# Patient Record
Sex: FEMALE | Race: WHITE | NOT HISPANIC OR LATINO | URBAN - METROPOLITAN AREA
[De-identification: names, ages, dates, MRNs, and addresses within clinical notes are randomized per-mention and may not be internally consistent; named-entity substitution may affect disease eponyms.]

---

## 2020-08-03 RX ORDER — ARIPIPRAZOLE 10 MG/1
TABLET ORAL
Qty: 90 TABLET | Refills: 0 | OUTPATIENT
Start: 2020-08-03

## 2020-08-03 RX ORDER — LAMOTRIGINE 150 MG/1
TABLET ORAL
Qty: 90 TABLET | Refills: 0 | OUTPATIENT
Start: 2020-08-03

## 2020-08-11 ENCOUNTER — TELEMEDICINE (OUTPATIENT)
Dept: PSYCHIATRY | Facility: CLINIC | Age: 50
End: 2020-08-11
Payer: COMMERCIAL

## 2020-08-11 DIAGNOSIS — F31.76 BIPOLAR I DISORDER, MOST RECENT EPISODE DEPRESSED, IN FULL REMISSION (HCC): Primary | ICD-10-CM

## 2020-08-11 PROCEDURE — 99214 OFFICE O/P EST MOD 30 MIN: CPT | Performed by: NURSE PRACTITIONER

## 2020-08-11 RX ORDER — ARIPIPRAZOLE 10 MG/1
10 TABLET ORAL DAILY
COMMUNITY
End: 2020-08-11 | Stop reason: SDUPTHER

## 2020-08-11 RX ORDER — ALPRAZOLAM 0.25 MG/1
.125-.25 TABLET ORAL DAILY PRN
COMMUNITY
End: 2020-08-11 | Stop reason: SDUPTHER

## 2020-08-11 RX ORDER — LAMOTRIGINE 150 MG/1
150 TABLET ORAL
COMMUNITY
End: 2020-08-11 | Stop reason: SDUPTHER

## 2020-08-11 RX ORDER — ALPRAZOLAM 0.25 MG/1
.125-.25 TABLET ORAL DAILY PRN
Qty: 30 TABLET | Refills: 0 | Status: SHIPPED | OUTPATIENT
Start: 2020-08-11 | End: 2021-03-09 | Stop reason: SDUPTHER

## 2020-08-11 RX ORDER — LAMOTRIGINE 150 MG/1
150 TABLET ORAL
Qty: 90 TABLET | Refills: 0 | Status: SHIPPED | OUTPATIENT
Start: 2020-08-11 | End: 2020-12-01 | Stop reason: SDUPTHER

## 2020-08-11 RX ORDER — ARIPIPRAZOLE 10 MG/1
10 TABLET ORAL DAILY
Qty: 90 TABLET | Refills: 0 | Status: SHIPPED | OUTPATIENT
Start: 2020-08-11 | End: 2020-12-01 | Stop reason: SDUPTHER

## 2020-08-11 NOTE — PATIENT INSTRUCTIONS
Continue aripiprazole 10 mg daily, lamotrigine 150 mg at bedtime, and alprazolam 0 25 mg 1/2 to 1 tablet daily as needed for anxiety; pt has trazodone previously prescribed that she sometimes takes to help sleep  Next appt 12 weeks

## 2020-08-11 NOTE — BH TREATMENT PLAN
TREATMENT PLAN         [facility]    Name and Date of Birth:  Surya Tao 44 y o  1970    Date of Treatment Plan: August 11, 2020    Diagnosis/Diagnoses:    1  Bipolar I disorder, most recent episode depressed, in full remission [facility])        Strengths/Personal Resources for Self-Care: taking medications as prescribed, ability to adapt to life changes, ability to communicate well, motivation for treatment  Area/Areas of need (in own words): sleep problems  1  Long Term Goal: decreasesleep problems  Target Date: 2 months - 10/11/2020  Person/Persons responsible for completion of goal: Cecy    2  Short Term Objective (s) - How will we reach this goal?:   A  Provider new recommended medication/dosage changes and/or continue medication(s): continue current medications as prescribed Lamictal, Abilify, Xanax  B  continue to diet and exercise to lose weight to improve sleep  C  take medications as prescribed, attend scheduled appts  Target Date: 3 months - 11/11/2020  Person/Persons Responsible for Completion of Goal: Cecy    Progress Towards Goals: continuing treatment    Treatment Modality: medication management every 12 weeks    Review due 90 to 120 days from date of this plan: 4 months - 12/11/2020  Expected length of service: ongoing treatment  My Physician/PA/NP and I have developed this plan together and I agree to work on the goals and objectives  I understand the treatment goals that were developed for my treatment

## 2020-08-11 NOTE — PSYCH
Psychiatric Progress Note   08/11/20     Cecy Tao 44 y o  female MRN: [id number]   @ Encounter: [id number]    Visit Diagnosis:   Encounter Diagnosis     ICD-10-CM    1  Bipolar I disorder, most recent episode depressed, in full remission ([facility] [address] )  F31 [id number]         Virtual Visit yes  Due to COVID-19 precautions and [facility] emergency, with patient's consent appointment was by phone Spoke with patient today for medication management  Pt reports taking medication as prescribed, denies side effects  Psychiatric status since the last visit: unchanged  Current Outpatient Medications:     ALPRAZolam (XANAX) 0 25 mg tablet, Take 0 125-0 25 mg by mouth daily as needed for anxiety, Disp: , Rfl:     ARIPiprazole (ABILIFY) 10 mg tablet, Take 10 mg by mouth daily, Disp: , Rfl:     lamoTRIgine (LaMICtal) 150 MG tablet, Take 150 mg by mouth daily at bedtime, Disp: , Rfl:    Sleep: decreased  Appetite: improved food intake to lose weight  Medication side effects: No   ROS: N/A  Vitals Taken no  There were no vitals filed for this visit  Progress Toward Goals: Stable other than decreased sleep    Assessment: is very happy at work, is dieting to lose weight and improve overall health and especially improve sleep; falls asleep on her arm and it becomes numb  Sometimes takes trazodone previously prescribed  Reports weighing 269 lbs when she began, now 258 lbs           Suicide/Homicide Risk Assessment: See mental Status Exam Below    Mental Status Evaluation:  Appearance:  televisit, not assessed   Behavior:  pleasant, cooperative, calm   Speech:  normal rate and volume   Mood:  euthymic   Affect:  televisit, not assessed   Thought Process:  organized, coherent, goal directed   Associations: intact associations   Thought Content:  normal   Perceptual Disturbances: none   Risk Potential: Suicidal ideation - None  Homicidal ideation - None  Potential for aggression - No   Sensorium:  oriented to person, place and time/date   Memory:  recent and remote memory grossly intact   Consciousness:  alert and awake   Attention/Concentration: attention span and concentration are age appropriate   Insight:  good   Judgment: good       Recommended Treatment:     Planned medication and treatment changes: All current active medications have been reviewed  Continue current psychiatric medications/doses: aripiprazole 10 mg daily, lamotrigine 150 mg daily at bedtime, and alprazolam 0 25 mg as needed for anxiety  Risks / Benefits of Treatment:    Risks, benefits, and possible side effects of medications explained to patient and patient verbalizes understanding and agreement for treatment  Counseling / Coordination of Care:    Medications, treatment progress and treatment plan reviewed with patient  Spent 25 minutes with pt via phone call      ABDIRAHMAN Ricketts 08/11/20

## 2020-12-01 ENCOUNTER — TELEMEDICINE (OUTPATIENT)
Dept: PSYCHIATRY | Facility: CLINIC | Age: 50
End: 2020-12-01
Payer: COMMERCIAL

## 2020-12-01 DIAGNOSIS — F31.76 BIPOLAR I DISORDER, MOST RECENT EPISODE DEPRESSED, IN FULL REMISSION (HCC): Primary | ICD-10-CM

## 2020-12-01 PROCEDURE — 99214 OFFICE O/P EST MOD 30 MIN: CPT | Performed by: NURSE PRACTITIONER

## 2020-12-01 RX ORDER — ARIPIPRAZOLE 10 MG/1
10 TABLET ORAL DAILY
Qty: 90 TABLET | Refills: 0 | Status: SHIPPED | OUTPATIENT
Start: 2020-12-01 | End: 2021-02-25

## 2020-12-01 RX ORDER — LEVOTHYROXINE SODIUM 0.07 MG/1
75 TABLET ORAL DAILY
COMMUNITY
Start: 2020-11-04 | End: 2022-03-28 | Stop reason: SDDI

## 2020-12-01 RX ORDER — LAMOTRIGINE 150 MG/1
150 TABLET ORAL
Qty: 90 TABLET | Refills: 0 | Status: SHIPPED | OUTPATIENT
Start: 2020-12-01 | End: 2021-02-25

## 2021-02-24 DIAGNOSIS — F31.76 BIPOLAR I DISORDER, MOST RECENT EPISODE DEPRESSED, IN FULL REMISSION (HCC): ICD-10-CM

## 2021-02-25 RX ORDER — LAMOTRIGINE 150 MG/1
TABLET ORAL
Qty: 90 TABLET | Refills: 0 | Status: SHIPPED | OUTPATIENT
Start: 2021-02-25 | End: 2021-06-01 | Stop reason: SDUPTHER

## 2021-02-25 RX ORDER — ARIPIPRAZOLE 10 MG/1
TABLET ORAL
Qty: 90 TABLET | Refills: 0 | Status: SHIPPED | OUTPATIENT
Start: 2021-02-25 | End: 2021-06-01 | Stop reason: SDUPTHER

## 2021-03-09 ENCOUNTER — TELEMEDICINE (OUTPATIENT)
Dept: PSYCHIATRY | Facility: CLINIC | Age: 51
End: 2021-03-09
Payer: COMMERCIAL

## 2021-03-09 DIAGNOSIS — F31.76 BIPOLAR I DISORDER, MOST RECENT EPISODE DEPRESSED, IN FULL REMISSION (HCC): Primary | ICD-10-CM

## 2021-03-09 PROCEDURE — 99214 OFFICE O/P EST MOD 30 MIN: CPT | Performed by: NURSE PRACTITIONER

## 2021-03-09 RX ORDER — ALPRAZOLAM 0.25 MG/1
.125-.25 TABLET ORAL DAILY PRN
Qty: 30 TABLET | Refills: 0 | Status: SHIPPED | OUTPATIENT
Start: 2021-03-09 | End: 2021-06-01 | Stop reason: SDUPTHER

## 2021-03-09 NOTE — PSYCH
PROGRESS NOTE        [facility]      Name and Date of Birth:  Leonel Tao 44 y o  1970    Date of Visit: 03/09/21    Pt was seen today for medication management for 30 minutes via Teams with pts consent  Door to office was closed, provider was alone in office  Time spent on Epic chart review of relevant information, note composition, and after-visit plan: 5  minutes  Total time for this visit: 35 minutes    SUBJECTIVE: mood generally stable, good  Is having trouble with her daughter who is 15 and has cognitive and impulse control d/o; pt is done, tired of dealing with the issues  Job is fine, no issues  Takes alprazolam once in a while  12/1/2020: doing well, her medications are working  Normal mood variability, sometimes a couple of days of depressed mood then back to normal  Has job stress  Awakened every couple of hours by pain in her hands from arthritis, goes right back to sleep  Rarely takes alprazolam, does not need a refill today  Continue aripiprazole 10 mg one tab po qd; lamotrigine 150 mg tab one tab po qHS; alprazolam 0 25 mg 1/2-1 tab po qd PRN anxiety      She denies suicidal ideation, intent or plan at present, has no suicidal ideation, intent or plan at present  She denies any auditory hallucinations and visual hallucinations, denies any other delusional thinking, denies any delusional thinking  She denies any side effects from medications    HPI ROS Appetite Changes and Sleep: normal appetite, normal sleep    Review Of Systems:      Constitutional Negative   ENT Negative   Cardiovascular Negative   Respiratory Negative   Gastrointestinal Negative   Genitourinary Negative   Musculoskeletal Negative   Integumentary Negative   Neurological Negative   Endocrine Negative   Other Symptoms Negative and None       Laboratory Results: No results found for this or any previous visit      Substance Abuse History:    Social History     Substance and Sexual Activity   Drug Use Not on file       Family Psychiatric History:     No family history on file      The following portions of the patient's history were reviewed and updated as appropriate: past family history, past medical history, past social history, past surgical history and problem list     Social History     Socioeconomic History    Marital status: /Civil Union     Spouse name: Not on file    Number of children: Not on file    Years of education: Not on file    Highest education level: Not on file   Occupational History    Not on file   Social Needs    Financial resource strain: Not on file    Food insecurity     Worry: Not on file     Inability: Not on file    Transportation needs     Medical: Not on file     Non-medical: Not on file   Tobacco Use    Smoking status: Not on file   Substance and Sexual Activity    Alcohol use: Not on file    Drug use: Not on file    Sexual activity: Not on file   Lifestyle    Physical activity     Days per week: Not on file     Minutes per session: Not on file    Stress: Not on file   Relationships    Social connections     Talks on phone: Not on file     Gets together: Not on file     Attends Druze service: Not on file     Active member of club or organization: Not on file     Attends meetings of clubs or organizations: Not on file     Relationship status: Not on file    Intimate partner violence     Fear of current or ex partner: Not on file     Emotionally abused: Not on file     Physically abused: Not on file     Forced sexual activity: Not on file   Other Topics Concern    Not on file   Social History Narrative    Not on file     Social History     Social History Narrative    Not on file        Social History    None             OBJECTIVE:     Mental Status Evaluation:    Appearance age appropriate, casually dressed   Behavior pleasant, cooperative   Speech normal volume, normal pitch   Mood frustrated   Affect Mood-congruent   Thought Processes logical   Associations intact associations   Thought Content normal   Perceptual Disturbances: none   Abnormal Thoughts  Risk Potential Suicidal ideation - None  Homicidal ideation - None  Potential for aggression - No   Orientation oriented to person, place, time/date and situation   Memory recent and remote memory grossly intact   Cosciousness alert and awake   Attention Span attention span and concentration are age appropriate   Intellect Not formally assessed   Insight age appropriate    Judgement good    Muscle Strength and  Gait muscle strength and tone were normal   Language no difficulty naming common objects   Fund of Knowledge displays adequate knowledge of current events   Pain none   Pain Scale 0       Assessment/Plan:       Diagnoses and all orders for this visit:    Bipolar I disorder, most recent episode depressed, in full remission ([facility] [address] )          Treatment Recommendations/Precautions:    Continue current medications: aripiprazole 10 mg one tab po qd; lamotrigine 150 mg tab one tab po qHS; alprazolam 0 25 mg 1/2-1 tab po qd PRN anxiety    Risks/Benefits      Risks, Benefits And Possible Side Effects Of Medications:    Risks, benefits, and possible side effects of medications explained to patient and patient verbalizes understanding and agreement for treatment  Controlled Medication Discussion:     MARIA DOLORES BRICE [facility] website data was reviewed with pt  Discussed with patient the risks of sedation, respiratory depression, impairment of ability to drive and potential for abuse and addiction related to treatment with ALPRAZOLAM  The patient understands risk of treatment with ALPRAZOLAM, agrees to not drive if feeling impaired, not to request early refills, to take medication as prescribed, to not drink alcohol when taking it, and to not share it with others      Psychotherapy Provided:     Individual psychotherapy provided: No

## 2021-05-28 DIAGNOSIS — F31.76 BIPOLAR I DISORDER, MOST RECENT EPISODE DEPRESSED, IN FULL REMISSION (HCC): ICD-10-CM

## 2021-06-01 ENCOUNTER — OFFICE VISIT (OUTPATIENT)
Dept: PSYCHIATRY | Facility: CLINIC | Age: 51
End: 2021-06-01
Payer: COMMERCIAL

## 2021-06-01 VITALS — HEIGHT: 64 IN | BODY MASS INDEX: 42.34 KG/M2 | WEIGHT: 248 LBS

## 2021-06-01 DIAGNOSIS — Z63.0 MARITAL CONFLICT: ICD-10-CM

## 2021-06-01 DIAGNOSIS — F31.76 BIPOLAR I DISORDER, MOST RECENT EPISODE DEPRESSED, IN FULL REMISSION (HCC): Primary | ICD-10-CM

## 2021-06-01 PROCEDURE — 99214 OFFICE O/P EST MOD 30 MIN: CPT | Performed by: NURSE PRACTITIONER

## 2021-06-01 PROCEDURE — 90833 PSYTX W PT W E/M 30 MIN: CPT | Performed by: NURSE PRACTITIONER

## 2021-06-01 RX ORDER — LAMOTRIGINE 150 MG/1
150 TABLET ORAL
Qty: 90 TABLET | Refills: 0 | Status: SHIPPED | OUTPATIENT
Start: 2021-06-01 | End: 2021-07-13 | Stop reason: DRUGHIGH

## 2021-06-01 RX ORDER — ALPRAZOLAM 0.25 MG/1
.125-.25 TABLET ORAL 2 TIMES DAILY PRN
Qty: 60 TABLET | Refills: 0 | Status: SHIPPED | OUTPATIENT
Start: 2021-06-01 | End: 2021-08-24 | Stop reason: SDUPTHER

## 2021-06-01 RX ORDER — ARIPIPRAZOLE 10 MG/1
10 TABLET ORAL DAILY
Qty: 90 TABLET | Refills: 0 | Status: SHIPPED | OUTPATIENT
Start: 2021-06-01 | End: 2021-09-21 | Stop reason: SDUPTHER

## 2021-06-01 RX ORDER — LAMOTRIGINE 150 MG/1
TABLET ORAL
Qty: 90 TABLET | Refills: 0 | OUTPATIENT
Start: 2021-06-01

## 2021-06-01 RX ORDER — ARIPIPRAZOLE 10 MG/1
TABLET ORAL
Qty: 90 TABLET | Refills: 0 | OUTPATIENT
Start: 2021-06-01

## 2021-06-01 SDOH — SOCIAL STABILITY - SOCIAL INSECURITY: PROBLEMS IN RELATIONSHIP WITH SPOUSE OR PARTNER: Z63.0

## 2021-06-01 NOTE — PATIENT INSTRUCTIONS
Begin to see Gwendolyn Sanchez [facility] for psychotherapy  Increase alprazolam to 0 25 mg - take one tablet by mouth BID PRN anxiety/insomnia  Continue:    aripiprazole 10 mg one tab po qd   lamotrigine 150 mg tab one tab po qHS

## 2021-06-01 NOTE — BH TREATMENT PLAN
TREATMENT PLAN         [facility] [employer]    Name and Date of Birth:  Cosmo Tao 44 y o  1970    Date of Treatment Plan: June 1, 2021    Diagnosis/Diagnoses:    1  Bipolar I disorder, most recent episode depressed, in full remission ([facility] [address] )    2  Marital conflict      Strengths/Personal Resources for Self-Care: taking medications as prescribed, ability to adapt to life changes, ability to communicate well, motivation for treatment      Area/Areas of need (in own words): depression  1          Long Term Goal: continue current status with medication  Target date - 12/1/2021   Person/Persons responsible for completion of goal: татьяна Sam     2          Short Term Objective (s) - How will we reach this goal?:   A  Provider new recommended medication/dosage changes and/or continue medication(s): continue current medications as prescribed Lamictal, Abilify, Xanax  B  take medications as prescribed, attend scheduled appts  C see [facility] psychotherapist  Target date - 12/1/2021   Person/Persons Responsible for Completion of Goal: татьяна Sam     Progress Towards Goals: continuing treatment     Treatment Modality: medication management every 12 weeks     Review due 120 - 180 days from date of this plan:  12/1/2021  Expected length of service: ongoing treatment  My Physician/PA/NP and I have developed this plan together and I agree to work on the goals and objectives  I understand the treatment goals that were developed for my treatment

## 2021-06-01 NOTE — PSYCH
PROGRESS NOTE        [facility]      Name and Date of Birth:  Vivian Tao 44 y o  1970    Date of Visit: 06/01/21    Patient was seen in the office today for medication management and psychotherapy  COVID-19 precautions were followed at all times: social distancing was maintained (except when when pts blood pressure was taken), hand-washing was performed before and after appointment; pts seat was cleaned with germicidal disposable wipes according to product instructions and room was ventilated before pt entered the room and after pt left it  Time spent on psychotherapy: 18 minutes    Treatment modality: medication management; medication education, supportive, self-care, encouraged adaptive behavior      SUBJECTIVE: job is ok  Her 15 yo daughter is off the wall, was in [place] for 5 days after talking about suicide, other daughter's father was living with pt and her family but moved out and is living with people who use drugs and pt is very worried about him  Pt's  is angry with her, says she can't save everyone, threatens to leave, but he has made this threat for 30 years and pt now just says so leave  Sometimes she loves him and sometimes she can't stand him  Her sister was recently diagnosed with colon cancer, and her brother has multiple myeloma  Pt is losing weight and doesn't know why  She sometimes takes two alprazolam so that she can sleep, she did last night but feels a bit drugged now, Doesn't know what she would like to do for herself  [place]'s [place] and the kids last weekend and it was nice, peaceful  Maybe she should see [facility] again for psychotherapy  3/9/2021: SUBJECTIVE: mood generally stable, good  Is having trouble with her daughter who is 15 and has cognitive and impulse control d/o; pt is done, tired of dealing with the issues  Job is fine, no issues  Takes alprazolam once in a while  She denies suicidal ideation, intent or plan at present, has no suicidal ideation, intent or plan at present  She denies any auditory hallucinations and visual hallucinations, denies any other delusional thinking, denies any delusional thinking  She denies any side effects from medications    HPI ROS Appetite Changes and Sleep: normal appetite, poor sleep    Review Of Systems:      Constitutional Negative   ENT Negative   Cardiovascular Negative   Respiratory Negative   Gastrointestinal Negative   Genitourinary Negative   Musculoskeletal Negative   Integumentary Negative   Neurological Negative   Endocrine Negative   Other Symptoms Negative and None       Laboratory Results: No results found for this or any previous visit  Substance Abuse History:    Social History     Substance and Sexual Activity   Drug Use Not on file       Family Psychiatric History:     No family history on file      The following portions of the patient's history were reviewed and updated as appropriate: past family history, past medical history, past social history, past surgical history and problem list     Social History     Socioeconomic History    Marital status: /Civil Union     Spouse name: Not on file    Number of children: Not on file    Years of education: Not on file    Highest education level: Not on file   Occupational History    Not on file   Social Needs    Financial resource strain: Not on file    Food insecurity     Worry: Not on file     Inability: Not on file    Transportation needs     Medical: Not on file     Non-medical: Not on file   Tobacco Use    Smoking status: Not on file   Substance and Sexual Activity    Alcohol use: Not on file    Drug use: Not on file    Sexual activity: Not on file   Lifestyle    Physical activity     Days per week: Not on file     Minutes per session: Not on file    Stress: Not on file   Relationships    Social connections     Talks on phone: Not on file Gets together: Not on file     Attends Pentecostal service: Not on file     Active member of club or organization: Not on file     Attends meetings of clubs or organizations: Not on file     Relationship status: Not on file    Intimate partner violence     Fear of current or ex partner: Not on file     Emotionally abused: Not on file     Physically abused: Not on file     Forced sexual activity: Not on file   Other Topics Concern    Not on file   Social History Narrative    Not on file     Social History     Social History Narrative    Not on file        Social History    None             OBJECTIVE:     Mental Status Evaluation:    Appearance age appropriate, casually dressed   Behavior pleasant, cooperative   Speech normal volume, normal pitch   Mood frustrated   Affect Mood-congruent   Thought Processes Coherent, organized, goal-directed   Associations intact associations   Thought Content normal   Perceptual Disturbances: none   Abnormal Thoughts  Risk Potential Suicidal ideation - None  Homicidal ideation - None  Potential for aggression - No   Orientation oriented to person, place, time/date and situation   Memory recent and remote memory grossly intact   Cosciousness alert and awake   Attention Span attention span and concentration are age appropriate   Intellect Not formally assessed   Insight age appropriate    Judgement good    Muscle Strength and  Gait muscle strength and tone were normal   Language no difficulty naming common objects   Fund of Knowledge displays adequate knowledge of current events   Pain none   Pain Scale 0     Patient's chart was reviewed for relevant lab reports and recent encounters with other providers  Medications, treatment progress and treatment plan were reviewed with patient  Treatment plan was updated with patient who agreed with updated plan      Assessment/Plan:       Diagnoses and all orders for this visit:    Bipolar I disorder, most recent episode depressed, in full remission ([facility] [address] )          Treatment Recommendations/Precautions:    Increase: alprazolam 0 25 mg 1/2-1 tab po qd PRN anxiety to 1/2 - 2 tabs po BID PRN anxiety/sleep  Continue current medications:    aripiprazole 10 mg one tab po qd   lamotrigine 150 mg tab one tab po qHS       Risks/Benefits       Risks, Benefits And Possible Side Effects Of Medications:     Risks, benefits, and possible side effects of medications explained to patient and patient verbalizes understanding and agreement for treatment      Controlled Medication Discussion:      MARIA DOLORES BRICE [facility] website data was reviewed with pt  Discussed with patient the risks of sedation, respiratory depression, impairment of ability to drive and potential for abuse and addiction related to treatment with ALPRAZOLAM  The patient understands risk of treatment with ALPRAZOLAM, agrees to not drive if feeling impaired, not to request early refills, to take medication as prescribed, to not drink alcohol when taking it, and to not share it with others

## 2021-07-13 ENCOUNTER — OFFICE VISIT (OUTPATIENT)
Dept: PSYCHIATRY | Facility: CLINIC | Age: 51
End: 2021-07-13
Payer: COMMERCIAL

## 2021-07-13 VITALS — HEIGHT: 64 IN | WEIGHT: 254.6 LBS | BODY MASS INDEX: 43.46 KG/M2

## 2021-07-13 DIAGNOSIS — Z63.0 MARITAL CONFLICT: ICD-10-CM

## 2021-07-13 DIAGNOSIS — F31.76 BIPOLAR I DISORDER, MOST RECENT EPISODE DEPRESSED, IN FULL REMISSION (HCC): Primary | ICD-10-CM

## 2021-07-13 PROCEDURE — 99214 OFFICE O/P EST MOD 30 MIN: CPT | Performed by: NURSE PRACTITIONER

## 2021-07-13 PROCEDURE — 90833 PSYTX W PT W E/M 30 MIN: CPT | Performed by: NURSE PRACTITIONER

## 2021-07-13 RX ORDER — LAMOTRIGINE 100 MG/1
TABLET ORAL
Qty: 11 TABLET | Refills: 0 | Status: SHIPPED | OUTPATIENT
Start: 2021-07-13 | End: 2021-08-24 | Stop reason: DRUGHIGH

## 2021-07-13 SDOH — SOCIAL STABILITY - SOCIAL INSECURITY: PROBLEMS IN RELATIONSHIP WITH SPOUSE OR PARTNER: Z63.0

## 2021-07-13 NOTE — PSYCH
This note was not shared with the patient due to privacy exception: note includes other individuals    [facility]      Name and Date of Birth:  Wade Tao 46 y o  1970    Date of Visit: 07/13/21    This patient was seen in the office today for medication management and psychotherapy  COVID-19 precautions were followed at all times: masks were worn by this patient and by this writer at all times, social distancing was maintained  Time spent on psychotherapy: 18 minutes    Treatment modality: medication management; medication education, pros and cons of changing medications, encourage adaptive behavior, recommended staying out of the sun and using sunscreen and frequently reapplying it, increasing understanding of causes of relationship issues      SUBJECTIVE: Cecy says that she wants to change from lamotrigine 150 mg qd and aripiprazole 10 mg qd to something else  In two weeks she is going to [facility]  [place] for vacation, and loves to go out in the sun, but then always develops Itchy pimples on her arms and lower legs, just like sunburn but in blotches  She feels her medications are to blame, and does not want to have to stay out of the sun as much as possible, she loves the sun  She agrees to remain on aripiprazole 10 mg for now, and to decrease and stop lamotrigine  She understands that mood instability and depression may return without lamotrigine  Family stresses continue, relationship with her  sometimes better than at other times  Sleep and anxiety somewhat improved with increased dose of alprazolam at her last appointment  Hasn't scheduled an appointment to restart seeing [facility]     6/1/2021: job is ok   Her 15 yo daughter is off the wall, was in [place] for 5 days after talking about suicide, other daughter's father was living with pt and her family but moved out and is living with people who use drugs and pt is very worried about him  Pt's  is angry with her, says she can't save everyone, threatens to leave, but he has made this threat for 30 years and pt now just says so leave  Sometimes she loves him and sometimes she can't stand him  Her sister was recently diagnosed with colon cancer, and her brother has multiple myeloma  Pt is losing weight and doesn't know why  She sometimes takes two alprazolam so that she can sleep, she did last night but feels a bit drugged now, Doesn't know what she would like to do for herself  [place] med[place]'s [place] the kids last weekend and it was nice, peaceful  Maybe she should see [facility] again for psychotherapy  She denies suicidal ideation, intent or plan at present, has no suicidal ideation, intent or plan at present  She denies any auditory hallucinations and visual hallucinations, denies any other delusional thinking, denies any delusional thinking  She denies any side effects from medications    HPI ROS Appetite Changes and Sleep: normal appetite, normal sleep    Review Of Systems:      Constitutional Negative   ENT Negative   Cardiovascular Negative   Respiratory Negative   Gastrointestinal Negative   Genitourinary Negative   Musculoskeletal Negative   Integumentary Negative   Neurological Negative   Endocrine Negative   Other Symptoms Negative and None       Laboratory Results: No results found for this or any previous visit  Substance Abuse History:    Social History     Substance and Sexual Activity   Drug Use Not on file       Family Psychiatric History:     No family history on file      The following portions of the patient's history were reviewed and updated as appropriate: past family history, past medical history, past social history, past surgical history and problem list     Social History     Socioeconomic History    Marital status: /Civil Union     Spouse name: Not on file    Number of children: Not on file    Years of education: Not on file    Highest education level: Not on file   Occupational History    Not on file   Tobacco Use    Smoking status: Not on file   Substance and Sexual Activity    Alcohol use: Not on file    Drug use: Not on file    Sexual activity: Not on file   Other Topics Concern    Not on file   Social History Narrative    Not on file     Social Determinants of Health     Financial Resource Strain:     Difficulty of Paying Living Expenses:    Food Insecurity:     Worried About Running Out of Food in the Last Year:     [address] in the Last Year:    Transportation Needs:     Lack of Transportation (Medical):      Lack of Transportation (Non-Medical):    Physical Activity:     Days of Exercise per Week:     Minutes of Exercise per Session:    Stress:     Feeling of Stress :    Social Connections:     Frequency of Communication with Friends and Family:     Frequency of Social Gatherings with Friends and Family:     Attends [employer] Services:     Active Member of Clubs or Organizations:     Attends Club or Organization Meetings:     Marital Status:    Intimate Partner Violence:     Fear of Current or Ex-Partner:     Emotionally Abused:     Physically Abused:     Sexually Abused:      Social History     Social History Narrative    Not on file        Social History    None             OBJECTIVE:     Mental Status Evaluation:    Appearance age appropriate, casually dressed   Behavior pleasant, cooperative   Speech normal volume, normal pitch   Mood ok   Affect Mood-congruent   Thought Processes Coherent, organized, goal-directed   Associations intact associations   Thought Content normal   Perceptual Disturbances: none   Abnormal Thoughts  Risk Potential Suicidal ideation - None  Homicidal ideation - None  Potential for aggression - No   Orientation oriented to person, place, time/date and situation   Memory recent and remote memory grossly intact   Cosciousness alert and awake   Attention Span attention span and concentration are age appropriate   Intellect Not formally assessed   Insight age appropriate    Judgement good    Muscle Strength and  Gait muscle strength and tone were normal   Language no difficulty naming common objects   Fund of Knowledge displays adequate knowledge of current events   Pain none   Pain Scale 0     The patient's chart was reviewed for relevant lab reports and recent encounters with other providers  Medications, treatment progress and treatment plan were reviewed with the patient  Assessment/Plan:       Diagnoses and all orders for this visit:    Bipolar I disorder, most recent episode depressed, in full remission ([facility] [address] )  -     lamoTRIgine (LaMICtal) 100 mg tablet; Take 1 tablet (100 mg total) by mouth daily for 7 days, THEN 0 5 tablets (50 mg total) daily for 8 days  Marital conflict          Treatment Recommendations/Precautions:    Decrease lamotrigine 150 mg as follows:    take 100 mg tablet by mouth daily for 7 days   Then take 1/2 of 100 mg tablet (=50 mg) daily x 8 days   Then stop lamotrigine    Continue current medications:               aripiprazole 10 mg one tab po qd   alprazolam 0 25 mg 1/2-1 tab po qd PRN anxiety to 1/2 - 2 tabs po BID PRN anxiety/sleep (dose was increased at last appointment)       Risks/Benefits       Risks, Benefits And Possible Side Effects Of Medications:     Risks, benefits, and possible side effects of medications explained to patient and patient verbalizes understanding and agreement for treatment  Discussed that this is a rapid decrease of lamotrigine, pt should be alert to increased mood stability and depression, and to possibility of seizures although pt is not taking it for epilepsy, pt verbalized understanding and acceptance of these risks      Controlled Medication Discussion:      [facility] website data was reviewed with pt   Discussed with patient the risks of sedation, respiratory depression, impairment of ability to drive and potential for abuse and addiction related to treatment with ALPRAZOLAM  The patient understands risk of treatment with ALPRAZOLAM, agrees to not drive if feeling impaired, not to request early refills, to take medication as prescribed, to not drink alcohol when taking it, and to not share it with others

## 2021-07-13 NOTE — PSYCH
This note was not shared with the patient due to {Reason why note was not shared:[id number]}    PROGRESS NOTE        [address] [facility]      Name and Date of Birth:  Johnny Tao 46 y o  1970    Date of Visit: 07/13/21    This patient was seen in the office today for medication management and psychotherapy  COVID-19 precautions were followed at all times: masks were worn by this patient and by this writer at all times, social distancing was maintained  Time spent on psychotherapy:  minutes    Treatment modality: medication management; medication education      SUBJECTIVE:    6/1/2021: ob is ok  Her 15 yo daughter is off the wall, was in [place] for 5 days after talking about suicide, other daughter's father was living with pt and her family but moved out and is living with people who use drugs and pt is very worried about him  Pt's  is angry with her, says she can't save everyone, threatens to leave, but he has made this threat for 30 years and pt now just says so leave  Sometimes she loves him and sometimes she can't stand him  Her sister was recently diagnosed with colon cancer, and her brother has multiple myeloma  Pt is losing weight and doesn't know why  She sometimes takes two alprazolam so that she can sleep, she did last night but feels a bit drugged now, Doesn't know what she would like to do for herself  [place]'s [place] the kids last weekend and it was nice, peaceful  Maybe she should see [facility] again for psychotherapy      3/9/2021: SUBJECTIVE: mood generally stable, good  Is having trouble with her daughter who is 15 and has cognitive and impulse control d/o; pt is done, tired of dealing with the issues  Job is fine, no issues  Takes alprazolam once in a while      She denies suicidal ideation, intent or plan at present, has no suicidal ideation, intent or plan at present      She denies any auditory hallucinations and visual hallucinations, denies any other delusional thinking, denies any delusional thinking  She denies any side effects from medications    HPI ROS Appetite Changes and Sleep: normal appetite, normal sleep    Review Of Systems:      Constitutional Negative   ENT Negative   Cardiovascular Negative   Respiratory Negative   Gastrointestinal Negative   Genitourinary Negative   Musculoskeletal Negative   Integumentary Negative   Neurological Negative   Endocrine Negative   Other Symptoms Negative and None       Laboratory Results: No results found for this or any previous visit  Substance Abuse History:    Social History     Substance and Sexual Activity   Drug Use Not on file       Family Psychiatric History:     No family history on file  The following portions of the patient's history were reviewed and updated as appropriate: past family history, past medical history, past social history, past surgical history and problem list     Social History     Socioeconomic History    Marital status: /Civil Union     Spouse name: Not on file    Number of children: Not on file    Years of education: Not on file    Highest education level: Not on file   Occupational History    Not on file   Tobacco Use    Smoking status: Not on file   Substance and Sexual Activity    Alcohol use: Not on file    Drug use: Not on file    Sexual activity: Not on file   Other Topics Concern    Not on file   Social History Narrative    Not on file     Social Determinants of Health     Financial Resource Strain:     Difficulty of Paying Living Expenses:    Food Insecurity:     Worried About Running Out of Food in the Last Year:     [address] in the Last Year:    Transportation Needs:     Lack of Transportation (Medical):      Lack of Transportation (Non-Medical):    Physical Activity:     Days of Exercise per Week:     Minutes of Exercise per Session:    Stress:     Feeling of Stress : Social Connections:     Frequency of Communication with Friends and Family:     Frequency of Social Gatherings with Friends and Family:     Attends [employer] Services:     Active Member of Clubs or Organizations:     Attends Club or Organization Meetings:     Marital Status:    Intimate Partner Violence:     Fear of Current or Ex-Partner:     Emotionally Abused:     Physically Abused:     Sexually Abused:      Social History     Social History Narrative    Not on file        Social History    None             OBJECTIVE:     Mental Status Evaluation:    Appearance age appropriate, casually dressed   Behavior pleasant, cooperative   Speech normal volume, normal pitch   Mood    Affect    Thought Processes Coherent, organized, goal-directed   Associations intact associations   Thought Content normal   Perceptual Disturbances: none   Abnormal Thoughts  Risk Potential Suicidal ideation - None  Homicidal ideation - None  Potential for aggression - No   Orientation oriented to person, place, time/date and situation   Memory recent and remote memory grossly intact   Cosciousness alert and awake   Attention Span attention span and concentration are age appropriate   Intellect Not formally assessed   Insight age appropriate    Judgement good    Muscle Strength and  Gait muscle strength and tone were normal   Language no difficulty naming common objects   Fund of Knowledge displays adequate knowledge of current events   Pain none   Pain Scale 0     The patient's chart was reviewed for relevant lab reports and recent encounters with other providers  Medications, treatment progress and treatment plan were reviewed with the patient       Assessment/Plan:       Diagnoses and all orders for this visit:    Bipolar I disorder, most recent episode depressed, in full remission ([facility] [address] )    Marital conflict          Treatment Recommendations/Precautions:    Increase: alprazolam 0 25 mg 1/2-1 tab po qd PRN anxiety to 1/2 - 2 tabs po BID PRN anxiety/sleep  Continue current medications:               aripiprazole 10 mg one tab po qd              lamotrigine 150 mg tab one tab po qHS        Risks/Benefits       Risks, Benefits And Possible Side Effects Of Medications:     Risks, benefits, and possible side effects of medications explained to patient and patient verbalizes understanding and agreement for treatment      Controlled Medication Discussion:      [facility]  website data was reviewed with pt   Discussed with patient the risks of sedation, respiratory depression, impairment of ability to drive and potential for abuse and addiction related to treatment with ALPRAZOLAM  The patient understands risk of treatment with ALPRAZOLAM, agrees to not drive if feeling impaired, not to request early refills, to take medication as prescribed, to not drink alcohol when taking it, and to not share it with others

## 2021-07-31 NOTE — PATIENT INSTRUCTIONS
Decrease lamotrigine 150 mg as follows:    take 100 mg tablet by mouth daily for 7 days   Then take 1/2 of 100 mg tablet (=50 mg) daily x 8 days   Then stop lamotrigine    Continue current medications:               aripiprazole 10 mg one tab po qd   alprazolam 0 25 mg 1/2-1 tab po qd PRN anxiety to 1/2 - 2 tabs po BID PRN anxiety/sleep (dose was increased at last appointment) Yes

## 2021-08-24 ENCOUNTER — OFFICE VISIT (OUTPATIENT)
Dept: PSYCHIATRY | Facility: CLINIC | Age: 51
End: 2021-08-24
Payer: COMMERCIAL

## 2021-08-24 DIAGNOSIS — F31.9 BIPOLAR I DISORDER, CURRENT EPISODE DEPRESSED (HCC): Primary | ICD-10-CM

## 2021-08-24 DIAGNOSIS — Z63.0 MARITAL CONFLICT: ICD-10-CM

## 2021-08-24 DIAGNOSIS — F31.76 BIPOLAR I DISORDER, MOST RECENT EPISODE DEPRESSED, IN FULL REMISSION (HCC): ICD-10-CM

## 2021-08-24 PROCEDURE — 99214 OFFICE O/P EST MOD 30 MIN: CPT | Performed by: NURSE PRACTITIONER

## 2021-08-24 PROCEDURE — 90833 PSYTX W PT W E/M 30 MIN: CPT | Performed by: NURSE PRACTITIONER

## 2021-08-24 RX ORDER — ALPRAZOLAM 0.25 MG/1
.125-.25 TABLET ORAL 2 TIMES DAILY PRN
Qty: 60 TABLET | Refills: 0 | Status: SHIPPED | OUTPATIENT
Start: 2021-08-24 | End: 2022-01-25 | Stop reason: SDUPTHER

## 2021-08-24 SDOH — SOCIAL STABILITY - SOCIAL INSECURITY: PROBLEMS IN RELATIONSHIP WITH SPOUSE OR PARTNER: Z63.0

## 2021-08-24 NOTE — PSYCH
This note was not shared with the patient due to this is a psychotherapy note    PROGRESS NOTE        [address] [facility]      Name and Date of Birth:  Wade Tao 46 y o  1970    Date of Visit: 08/24/21    This patient was seen in the office today for medication management and psychotherapy  COVID-19 precautions were followed at all times: masks were worn by patient and provider, goggles were worn by provider, social distancing was maintained, hand-washing was performed before and after appointment, pts seat and providers electronic signature device and stylus were cleaned with germicidal disposable wipes according to product instructions, and room was ventilated before pt entered the room and after pt left it  Time spent on psychotherapy:  18 minutes    Treatment modality: medication management; medication education, supportive psychotherapy and stress management re: ongoing problems with daughter, daughter's father, illness in pt's family; increase insight into marital problems related to daughter's father and pt's       SUBJECTIVE : PHQ9 score = 15  Cecy reports that in the past two weeks, nearly every day she has had trouble both staying asleep and sleeping too much, has been tired/had little energy, and has been overeating; on more than half of the days she has felt like she's letting her  down due to her relationship with Corey Moss (see below); and for several days she has had anhedonia, and trouble concentrating because she has been worrying about her 15 yo daughter  Cecy says that her sun-exposure rash lessened when she was on vacation, after she decreased and stopped lamotrigine  However, she now has screaming anxiety due to her situation at home  Her  is increasingly upset by her relationship with Corey Moss, her adopted daughter's father, and pt admits that she does have feelings for [address]   Her  would like Corey Moss gone, not living with pt and him anymore, and Dang Hendrix is upset about that  Stress caused by the daughter's impulse control problems and poor judgment continues  In addition to these stresses, pt' is also affected by the fact that in May, her sister was dx with colon cancer Stage 4, lost 150 lbs sice last November, and pt's brother had cancer for 7 years  Pt agrees to restart lamotrigine and increase aripiprazole to 15 mg from her 10 mg supply on hand  Pt is going to begin seeing Marilu Baldwin [facility] for psychotherapy on 9/2/2021 7/13/2021: Griselda Adam says that she wants to change from lamotrigine 150 mg qd and aripiprazole 10 mg qd to something else  In two weeks she is going to [facility]  [place] for [place], and loves to go out in the sun, but then always develops Itchy pimples on her arms and lower legs, just like sunburn but in blotches  She feels her medications are to blame, and does not want to have to stay out of the sun as much as possible, she loves the sun  She agrees to remain on aripiprazole 10 mg for now, and to decrease and stop lamotrigine  She understands that mood instability and depression may return without lamotrigine  Family stresses continue, relationship with her  sometimes better than at other times  Sleep and anxiety somewhat improved with increased dose of alprazolam at her last appointment  Hasn't scheduled an appointment to restart seeing Marilu Baldwin [facility]  Discussed pros and cons of changing medications, encourage adaptive behavior, recommended staying out of the sun and using sunscreen and frequently reapplying it, increasing understanding of causes of relationship issues  Decrease lamotrigine 150 mg as follows:               take 100 mg tablet by mouth daily for 7 days              Then take 1/2 of 100 mg tablet (=50 mg) daily x 8 days              Then stop lamotrigine     6/1/2021: job is ok   Her 15 yo daughter is off the wall, was in [place] for 5 days after talking about suicide, other daughter's father was living with pt and her family but moved out and is living with people who use drugs and pt is very worried about him  Pt's  is angry with her, says she can't save everyone, threatens to leave, but he has made this threat for 30 years and pt now just says so leave  Sometimes she loves him and sometimes she can't stand him  Her sister was recently diagnosed with colon cancer, and her brother has multiple myeloma  Pt is losing weight and doesn't know why  She sometimes takes two alprazolam so that she can sleep, she did last night but feels a bit drugged now, Doesn't know what she would like to do for herself  [place] medina's father and the kids last weekend and it was nice, peaceful  Maybe she should see Albina Brittle [facility] again for psychotherapy  She denies suicidal ideation, intent or plan at present, has no suicidal ideation, intent or plan at present  She denies any auditory hallucinations and visual hallucinations, denies any other delusional thinking, denies any delusional thinking  She denies any side effects from medications    HPI ROS Appetite Changes and Sleep: normal appetite, variable sleep    Review Of Systems:      Constitutional Negative   ENT Negative   Cardiovascular Negative   Respiratory Negative   Gastrointestinal Negative   Genitourinary Negative   Musculoskeletal Negative   Integumentary Negative   Neurological Negative   Endocrine Negative   Other Symptoms Negative and None       Laboratory Results: No results found for this or any previous visit  Substance Abuse History:    Social History     Substance and Sexual Activity   Drug Use Not on file       Family Psychiatric History:     No family history on file      The following portions of the patient's history were reviewed and updated as appropriate: past family history, past medical history, past social history, past surgical history and problem list     Social History     Socioeconomic History    Marital status: /Civil Union     Spouse name: Not on file    Number of children: Not on file    Years of education: Not on file    Highest education level: Not on file   Occupational History    Not on file   Tobacco Use    Smoking status: Not on file   Substance and Sexual Activity    Alcohol use: Not on file    Drug use: Not on file    Sexual activity: Not on file   Other Topics Concern    Not on file   Social History Narrative    Not on file     Social Determinants of Health     Financial Resource Strain:     Difficulty of Paying Living Expenses:    Food Insecurity:     Worried About Running Out of Food in the Last Year:     [address] in the Last Year:    Transportation Needs:     Lack of Transportation (Medical):      Lack of Transportation (Non-Medical):    Physical Activity:     Days of Exercise per Week:     Minutes of Exercise per Session:    Stress:     Feeling of Stress :    Social Connections:     Frequency of Communication with Friends and Family:     Frequency of Social Gatherings with Friends and Family:     Attends Confucianism Services:     Active Member of Clubs or Organizations:     Attends Club or Organization Meetings:     Marital Status:    Intimate Partner Violence:     Fear of Current or Ex-Partner:     Emotionally Abused:     Physically Abused:     Sexually Abused:      Social History     Social History Narrative    Not on file        Social History    None             OBJECTIVE:     Mental Status Evaluation:    Appearance age appropriate, casually dressed   Behavior pleasant, cooperative   Speech normal volume, normal pitch   Mood Worried, unhappy   Affect Mood-congruent   Thought Processes Coherent, organized, goal-directed   Associations intact associations   Thought Content normal   Perceptual Disturbances: none   Abnormal Thoughts  Risk Potential Suicidal ideation - None  Homicidal ideation - None  Potential for aggression - No   Orientation oriented to person, place, time/date and situation   Memory recent and remote memory grossly intact   Consciousness alert and awake   Attention Span attention span and concentration are age appropriate   Intellect Not formally assessed   Insight age appropriate    Judgement good    Muscle Strength and  Gait muscle strength and tone were normal   Language no difficulty naming common objects   Fund of Knowledge displays adequate knowledge of current events   Pain none   Pain Scale 0     The patient's chart was reviewed for relevant lab reports and recent encounters with other providers  Medications, treatment progress and treatment plan were reviewed with the patient  Assessment/Plan:       Diagnoses and all orders for this visit:    Bipolar I disorder, most recent episode depressed, in full remission ([facility] [address] )    Marital conflict        Treatment Recommendations/Precautions:    Restart lamotrigine 25 mg tablet - take 1 tablet by mouth daily at bedtime x 14 days then increase to 2 tablets by mouth daily at bedtime                 Increase aripiprazole 10 mg tablet - take 1&1/2 tablets by mouth every day (increase from 10 mg supply on hand)     Continue current medications:               alprazolam 0 25 mg tablet - take 1/2 - 1  tablets by mouth two times daily if needed for anxiety/sleep         Risks/Benefits       Risks, Benefits And Possible Side Effects Of Medications:     Risks, benefits, and possible side effects of medications explained to patient and patient verbalizes understanding and agreement for treatment  Discussed that this is a rapid decrease of lamotrigine, pt should be alert to increased mood stability and depression, and to possibility of seizures although pt is not taking it for epilepsy, pt verbalized understanding and acceptance of these risks      Controlled Medication Discussion:      [facility]OLOR[facility] BRICE  website data was reviewed with pt   Discussed with patient the risks of sedation, respiratory depression, impairment of ability to drive and potential for abuse and addiction related to treatment with ALPRAZOLAM  The patient understands risk of treatment with ALPRAZOLAM, agrees to not drive if feeling impaired, not to request early refills, to take medication as prescribed, to not drink alcohol when taking it, and to not share it with others

## 2021-08-28 RX ORDER — LAMOTRIGINE 25 MG/1
TABLET ORAL
Qty: 42 TABLET | Refills: 0 | Status: SHIPPED | OUTPATIENT
Start: 2021-08-28 | End: 2021-09-21 | Stop reason: DRUGHIGH

## 2021-08-28 NOTE — PATIENT INSTRUCTIONS
Restart lamotrigine 25 mg tablet - take 1 tablet by mouth daily at bedtime x 14 days then increase to 2 tablets by mouth daily at bedtime                 Increase aripiprazole 10 mg tablet - take 1&1/2 tablets by mouth every day (increase from 10 mg supply on hand)     Continue current medications:               alprazolam 0 25 mg tablet - take 1/2 - 1  tablets by mouth two times daily if needed for anxiety/sleep

## 2021-09-02 ENCOUNTER — SOCIAL WORK (OUTPATIENT)
Dept: BEHAVIORAL/MENTAL HEALTH CLINIC | Facility: CLINIC | Age: 51
End: 2021-09-02
Payer: COMMERCIAL

## 2021-09-02 DIAGNOSIS — F31.30 BIPOLAR I DISORDER, MOST RECENT EPISODE DEPRESSED (HCC): Primary | ICD-10-CM

## 2021-09-02 PROCEDURE — 90791 PSYCH DIAGNOSTIC EVALUATION: CPT

## 2021-09-02 NOTE — BH TREATMENT PLAN
Cecy Tao  1970       Date of Initial Treatment Plan: 9/2/2021   Date of Current Treatment Plan: 09/02/21    Treatment Plan Number 1     Strengths/Personal Resources for Self Care: like to help others and I am a strong person    Diagnosis:   1  Bipolar I disorder, most recent episode depressed ([facility] [address] )         Area of Needs: recognize limitations      Long Term Goal 1: Afinding balance in home life and marriage    Target Date: N/A  Completion Date: N/A         Short Term Objectives for Goal 1: Act will create distance between friend, Jamie Patrick will be present more for  and Cct will try to be more present for Edwige  Long Term Goal 2: N/A    Target Date: N/A  Completion Date: N/A    Short Term Objectives for Goal 2: N/A         Long Term Goal # 3: N/A     Target Date: N/A  Completion Date: N/A    Short Term Objectives for Goal 3:     GOAL 1: Modality: Individual 2x per month   Completion Date 2/2/2022      Behavioral Health Treatment Plan [facility]: Diagnosis and Treatment Plan explained to Slime Black relates understanding diagnosis and is agreeable to Treatment Plan         Client Comments : Please share your thoughts, feelings, need and/or experiences regarding your treatment plan: ct agreed

## 2021-09-02 NOTE — PSYCH
Assessment/Plan: Individual counseling and medication management     Diagnoses and all orders for this visit:    Bipolar I disorder, most recent episode depressed ([facility] [address] )          Subjective:      Patient ID: Le Goins is a 46 y o  female  HPI:     Pre-morbid level of function and History of Present Illness: ct is known to this clinician as she was seen by me in private practice  2 years ago  Ct reports ongoing issues with her oldest daughter Genet Gonzalez is 15 but has developmental issues and functions like she is younger  Daughter also has behavioral issues including ADHD/impulsivity  Ct reports that 2 years ago oldest daughters father came back in their lives  [employer] is adapted)  He is a drug addict and when he came back into there lives he was on drug court  Ct shared that he moved in and got a job  He was discharged from drug court after completing it in April 2021  He moved out in May 2021  This person and ct became emotionally close  He relies on ct for rides and support  Ct  stepped in and is requesting that ct spend less time with him  He now has to get his own rides to and from work  As a result ct marriage is strained and it has had a negative impact on the family  Ct goal is to work on spending more time with family  Ct is experiancing increased anxiety over the choices she feels she is being forced to make  Ct realizes that while she does not like it it is for the better  Ct also stopped taking mood stabilizer in June and is back on it recently  Previous Psychiatric/psychological treatment/year: past individual counseling and medication management  Current Psychiatrist/Therapist: Jessica/Celio  Outpatient and/or Partial and Other Community Resources Used ([facility], [facility], [facility]): ct has history of Partial program      Problem Assessment:     SOCIAL/VOCATION:  Family Constellation (include parents, relationship with each and pertinent Psych/Medical History):      No family history on file  Mother: depression  Spouse: none   Father: alzheimer's depressed   Children: Ediwge bipolar ADHD   Sibling: sister cancer depression   Sibling: brother cancer and depression   Children: none   Other: none    Cecy relates best to Harley  she lives with  and 2 children  she does not live alone  Domestic Violence: No past history of domestic violence    Additional Comments related to family/relationships/peer support: some marriage issues  School or Work History (strengths/limitations/needs): fulltime at [employer] for 4 years    Her highest grade level achieved was graduated HS and nursing school     history includesnone    Financial status includes stable    LEISURE ASSESSMENT (Include past and present hobbies/interests and level of involvement (Ex: Group/Club Affiliations): swim, zoo  her primary language is [place]  Preferred language is [place]  Ethnic considerations are none  Religions affiliations and level of involvement Hoahaoism   Does spirituality help you cope? Yes     FUNCTIONAL STATUS: There has been a recent change in Cecy ability to do the following: does not need can service    Level of Assistance Needed/By Whom?: none    Cecy learns best by  reading, listening, demonstration and picture    SUBSTANCE ABUSE ASSESSMENT: no substance abuse    Substance/Route/Age/Amount/Frequency/Last Use: none    DETOX HISTORY: none    Previous detox/rehab treatment: none    HEALTH ASSESSMENT: PCP not notified     LEGAL: na    Prenatal History: N/A    Delivery History: N/A    Developmental Milestones: N/A  Temperament as an infant was N/A  Temperament as a toddler was N/A  Temperament at school age was N/A  Temperament as a teenager was N/A      Risk Assessment:   The following ratings are based on my review of records    Risk of Harm to Self:   Demographic risk factors include   Historical Risk Factors include none  Recent Specific Risk Factors include none  Additional Factors for a Child or Adolescent na    Risk of Harm to Others:   Demographic Risk Factors include none  Historical Risk Factors include none  Recent Specific Risk Factors include none    Access to Weapons:   Cecy has access to the following weapons: none   The following steps have been taken to ensure weapons are properly secured: na    Based on the above information, the client presents the following risk of harm to self or others:  low    The following interventions are recommended:   no intervention changes    Notes regarding this Risk Assessment: none        Mental status:  Appearance calm and cooperative    Mood depressed and anxious   Affect affect was flat   Speech a normal rate   Thought Processes normal thought processes   Hallucinations no hallucinations present    Thought Content no delusions   Abnormal Thoughts no suicidal thoughts  and no homicidal thoughts    Orientation  oriented to person and place and time   Remote Memory short term memory intact and long term memory intact   Attention Span concentration intact   Intellect Appears to be of [place] of Knowledge not tested   Insight Limited insight   Judgement judgment was limited   Muscle Strength Normal gait    Language no difficulty writing a sentence    Pain none   Pain Scale 0

## 2021-09-07 ENCOUNTER — SOCIAL WORK (OUTPATIENT)
Dept: BEHAVIORAL/MENTAL HEALTH CLINIC | Facility: CLINIC | Age: 51
End: 2021-09-07
Payer: COMMERCIAL

## 2021-09-07 DIAGNOSIS — F31.30 BIPOLAR I DISORDER, MOST RECENT EPISODE DEPRESSED (HCC): Primary | ICD-10-CM

## 2021-09-07 PROCEDURE — 90834 PSYTX W PT 45 MINUTES: CPT

## 2021-09-07 NOTE — PSYCH
Psychotherapy Provided: Individual Psychotherapy 45 minutes     Length of time in session: 45 minutes, follow up in 2 week    Encounter Diagnosis     ICD-10-CM    1  Bipolar I disorder, most recent episode depressed ([facility] [address] )  F31 30        Goals addressed in session: Goal 1     Pain:      none    0    Current suicide risk : Low     D-ct shared that she is struggling with her oldest daughter  Ct reports that she is passive aggressive and does things that get her into trouble and does not understand why  Ct daughter can be impulsive and that maybe the issue  Ct reports that the issue with Lita Cazares and her  seemed fine over the weekend  Ct reports youngest daughter is having some separation anxiety issues  Ct  is going to sons football game this Saturday and ct is not going and ct is upset  Ct does not want to go because of daughters separation anxiety  A- ct presented with stable mood and mild anxiety  Ct shared that she thinks she may have TD  Ct reported that for a month or 2 or toes roll and her hand shakes  Ct states that she can control it when she becomes aware of it but is not always aware of it  I will contact provider  P- ct will attend session and tyler techniques to manage moods and improve communication in the home  Behavioral Health Treatment Plan [facility]: Diagnosis and Treatment Plan explained to Justo Doll relates understanding diagnosis and is agreeable to Treatment Plan   Yes

## 2021-09-21 ENCOUNTER — SOCIAL WORK (OUTPATIENT)
Dept: BEHAVIORAL/MENTAL HEALTH CLINIC | Facility: CLINIC | Age: 51
End: 2021-09-21
Payer: COMMERCIAL

## 2021-09-21 DIAGNOSIS — F31.76 BIPOLAR I DISORDER, MOST RECENT EPISODE DEPRESSED, IN FULL REMISSION (HCC): Primary | ICD-10-CM

## 2021-09-21 DIAGNOSIS — F31.30 BIPOLAR I DISORDER, MOST RECENT EPISODE DEPRESSED (HCC): Primary | ICD-10-CM

## 2021-09-21 PROCEDURE — 90834 PSYTX W PT 45 MINUTES: CPT

## 2021-09-21 RX ORDER — LAMOTRIGINE 25 MG/1
25 TABLET ORAL 2 TIMES DAILY WITH MEALS
COMMUNITY
End: 2021-09-21 | Stop reason: SDUPTHER

## 2021-09-21 RX ORDER — LAMOTRIGINE 25 MG/1
25 TABLET ORAL 2 TIMES DAILY
Qty: 60 TABLET | Refills: 0 | Status: SHIPPED | OUTPATIENT
Start: 2021-09-21 | End: 2021-09-28 | Stop reason: SDUPTHER

## 2021-09-21 RX ORDER — ARIPIPRAZOLE 10 MG/1
10 TABLET ORAL DAILY
Qty: 90 TABLET | Refills: 0 | Status: SHIPPED | OUTPATIENT
Start: 2021-09-21 | End: 2021-11-02

## 2021-09-21 NOTE — TELEPHONE ENCOUNTER
----- Message from Wen Oden sent at 9/21/2021  9:20 AM EDT -----  Regarding: Refill  Abilify 15mg  Lamictal 50mg  [facility] [place] dlmary garduno  Appt 9/29Celio

## 2021-09-21 NOTE — PSYCH
Psychotherapy Provided: Individual Psychotherapy 45 minutes     Length of time in session: 45 minutes, follow up in 2 week    Encounter Diagnosis     ICD-10-CM    1  Bipolar I disorder, most recent episode depressed ([address] )  F31 30        Goals addressed in session: Goal 1     Pain:      none    0    Current suicide risk : Low     D- ct shared that she is struggling with oldest daughter who constantly acts out  Ct feels bad because she gets mad at daughter and says things that do not make the situation better  Ct oldest daughter feels that younger sister gets treated better  Ct explained that youngest daughter does not act out  Ct reports that her and  are not on the same page with oldest daughter  Ct and  are going away for a few days and ct is encouraged to reconnect with him as she has admittedly been emotionally distant from him  A- ct presented with mild anxiety and depression  Ct was verbal and engaged in session  Ct sleep is inconsistent  Ct reported that she still has some movement in her feet and one hand maily happening at work  P- ct will attend session, take med's as instructed, and use techniques to manage moods and improve communication at home  Behavioral Health Treatment Plan [facility]: Diagnosis and Treatment Plan explained to Justo Doll relates understanding diagnosis and is agreeable to Treatment Plan   Yes

## 2021-09-28 DIAGNOSIS — F31.76 BIPOLAR I DISORDER, MOST RECENT EPISODE DEPRESSED, IN FULL REMISSION (HCC): ICD-10-CM

## 2021-09-28 RX ORDER — LAMOTRIGINE 25 MG/1
25 TABLET ORAL 2 TIMES DAILY
Qty: 180 TABLET | Refills: 0 | Status: SHIPPED | OUTPATIENT
Start: 2021-09-28 | End: 2022-01-25 | Stop reason: SDUPTHER

## 2021-09-29 RX ORDER — ARIPIPRAZOLE 10 MG/1
10 TABLET ORAL DAILY
Qty: 90 TABLET | Refills: 0 | OUTPATIENT
Start: 2021-09-29

## 2021-09-29 RX ORDER — LAMOTRIGINE 25 MG/1
25 TABLET ORAL 2 TIMES DAILY
Qty: 180 TABLET | Refills: 0 | OUTPATIENT
Start: 2021-09-29

## 2021-09-30 ENCOUNTER — SOCIAL WORK (OUTPATIENT)
Dept: BEHAVIORAL/MENTAL HEALTH CLINIC | Facility: CLINIC | Age: 51
End: 2021-09-30
Payer: COMMERCIAL

## 2021-09-30 DIAGNOSIS — F31.30 BIPOLAR I DISORDER, MOST RECENT EPISODE DEPRESSED (HCC): Primary | ICD-10-CM

## 2021-09-30 PROCEDURE — 90834 PSYTX W PT 45 MINUTES: CPT

## 2021-09-30 NOTE — PSYCH
Psychotherapy Provided: Individual Psychotherapy 45 minutes     Length of time in session: 45 minutes, follow up in 1-2 week    Encounter Diagnosis     ICD-10-CM    1  Bipolar I disorder, most recent episode depressed ([facility] [address] )  F31 30        Goals addressed in session: Goal 1     Pain:      none    0    Current suicide risk : Low        D- ct shared that she and  went on a trip and it started out well  Ct  got intoxicated and feel down the stairs  Fortunately he was alright aside from bruises and sore muscles the next day  Ct shared that she feels love and disgust for   Ct is emotionally disconnected from   Ct children play a role in her feelings  Ct oldest daughter is acting out daily  Ct returns to work this weekend  A- ct presented with mild anxiety  I did observe her left hand shaking during session  Her hand was on her leg and you can see the pinkie and ring finger tremoring  When I mentioned it she was able to stop it  Ct toes were also moving un and down pretty uniformly  Ct was encouraged to see doctor as she complained about some cramping in her left hand  P- ct will attend session, take med's as instructed, and use techniques to manage anxiety, moods, and improve communication  Behavioral Health Treatment Plan [facility]: Diagnosis and Treatment Plan explained to Irish Del Castillo relates understanding diagnosis and is agreeable to Treatment Plan   Yes

## 2021-11-02 ENCOUNTER — TELEMEDICINE (OUTPATIENT)
Dept: PSYCHIATRY | Facility: CLINIC | Age: 51
End: 2021-11-02
Payer: COMMERCIAL

## 2021-11-02 DIAGNOSIS — F31.75 BIPOLAR 1 DISORDER, DEPRESSED, PARTIAL REMISSION (HCC): Primary | ICD-10-CM

## 2021-11-02 DIAGNOSIS — Z63.0 MARITAL CONFLICT: ICD-10-CM

## 2021-11-02 PROCEDURE — 99214 OFFICE O/P EST MOD 30 MIN: CPT | Performed by: NURSE PRACTITIONER

## 2021-11-02 RX ORDER — ARIPIPRAZOLE 15 MG/1
15 TABLET ORAL DAILY
Qty: 90 TABLET | Refills: 0 | Status: SHIPPED | OUTPATIENT
Start: 2021-11-02 | End: 2022-01-25 | Stop reason: SDUPTHER

## 2021-11-02 SDOH — SOCIAL STABILITY - SOCIAL INSECURITY: PROBLEMS IN RELATIONSHIP WITH SPOUSE OR PARTNER: Z63.0

## 2022-01-25 ENCOUNTER — OFFICE VISIT (OUTPATIENT)
Dept: PSYCHIATRY | Facility: CLINIC | Age: 52
End: 2022-01-25
Payer: COMMERCIAL

## 2022-01-25 VITALS — BODY MASS INDEX: 44.25 KG/M2 | WEIGHT: 259.2 LBS | HEIGHT: 64 IN

## 2022-01-25 DIAGNOSIS — F31.75 BIPOLAR 1 DISORDER, DEPRESSED, PARTIAL REMISSION (HCC): Primary | ICD-10-CM

## 2022-01-25 DIAGNOSIS — Z63.0 MARITAL CONFLICT: ICD-10-CM

## 2022-01-25 DIAGNOSIS — F31.76 BIPOLAR I DISORDER, MOST RECENT EPISODE DEPRESSED, IN FULL REMISSION (HCC): ICD-10-CM

## 2022-01-25 PROCEDURE — 99214 OFFICE O/P EST MOD 30 MIN: CPT | Performed by: NURSE PRACTITIONER

## 2022-01-25 PROCEDURE — 90833 PSYTX W PT W E/M 30 MIN: CPT | Performed by: NURSE PRACTITIONER

## 2022-01-25 RX ORDER — LAMOTRIGINE 25 MG/1
50 TABLET ORAL
Qty: 180 TABLET | Refills: 0 | Status: SHIPPED | OUTPATIENT
Start: 2022-01-25 | End: 2022-04-19 | Stop reason: SDUPTHER

## 2022-01-25 RX ORDER — ARIPIPRAZOLE 15 MG/1
15 TABLET ORAL DAILY
Qty: 90 TABLET | Refills: 0 | Status: SHIPPED | OUTPATIENT
Start: 2022-01-25 | End: 2022-04-19 | Stop reason: SDUPTHER

## 2022-01-25 RX ORDER — ALPRAZOLAM 0.25 MG/1
.125-.25 TABLET ORAL 2 TIMES DAILY PRN
Qty: 60 TABLET | Refills: 0 | Status: SHIPPED | OUTPATIENT
Start: 2022-01-25 | End: 2022-04-19 | Stop reason: SDUPTHER

## 2022-01-25 SDOH — SOCIAL STABILITY - SOCIAL INSECURITY: PROBLEMS IN RELATIONSHIP WITH SPOUSE OR PARTNER: Z63.0

## 2022-01-25 NOTE — PSYCH
This note was not shared with the patient due to privacy exception: note includes other individuals    [facility]      Name and Date of Birth:  Destiney Tao 46 y o  1970    Date of Visit: 01/25/22      This patient was seen in the office today for medication management and psychotherapy  COVID-19 precautions were followed at all times: masks were worn by patient and provider, goggles were worn by provider, social distancing was maintained, hand-washing was performed before and after appointment, pt's seat was cleaned with germicidal disposable wipes according to product instructions, and room was ventilated before pt entered the room and after pt left it  Time spent on psychotherapy: 18 minutes    Treatment modality: medication management; medication education, supportive psychotherapy re job stress, discussed limits to pt's control of others to achieve the outcomes she thinks are best      SUBJECTIVE: doing ok, taking medication as prescribed, reports good symptom control, denies side effects  Her job is very hard, her job place is understaffed, there used to be more teamwork  Her life is very complicated and she is anxious all of the time, shakes, people ask her what is wrong, it's her daughter, worries about Catherine Balsam (medina's biological father), about her , about work  Wakes up a few times at night but goes right back to sleep, if she does not she takes an alprazolam, doesn't happen often  It's a lot of work, like keeping her daughter on the straight and narrow, feels she has to support her and others, enjoys being that person but it takes a lot out of her  Appetite- too good  Doesn't want to change her medications/doses    11/1/2021: Is a little better than at last appt, her  has become involved in caring for their daughter and it has relieved some pressure on pt   Appetite fine, sleep broken but goes right back to sleep  Cory Cowart is still in the picture, her  gets a little jealous but it's been much better  No rash  Energy level normal  Doesn't think she feels different on aripiprazole 15 mg than on 10 mg but also thinks it's possible that if she took a lower dose it wouldn't be as good  Feels inner restlessness, she has for months, 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; doesn't think she will probably exercise  Doesn't take alprazolam every day, doesn't need refill today  Agrees that depressed mood is in partial remission        She denies suicidal ideation, intent or plan at present, has no suicidal ideation, intent or plan at present  She denies any auditory hallucinations and visual hallucinations, denies any other delusional thinking, denies any delusional thinking  She denies any side effects from medications      HPI ROS Appetite Changes and Sleep: normal appetite, normal sleep    Review Of Systems:      Constitutional Negative   ENT Negative   Cardiovascular Negative   Respiratory Negative   Gastrointestinal Negative   Genitourinary Negative   Musculoskeletal Negative   Integumentary Negative   Neurological Negative   Endocrine Negative   Other Symptoms Negative and None       Laboratory Results: No results found for this or any previous visit  Substance Abuse History:    Social History     Substance and Sexual Activity   Drug Use Not on file       Family Psychiatric History:     No family history on file      The following portions of the patient's history were reviewed and updated as appropriate: past family history, past medical history, past social history, past surgical history and problem list     Social History     Socioeconomic History    Marital status: /Civil Union     Spouse name: Not on file    Number of children: Not on file    Years of education: Not on file    Highest education level: Not on file   Occupational History    Not on file   Tobacco Use    Smoking status: Not on file    Smokeless tobacco: Not on file   Substance and Sexual Activity    Alcohol use: Not on file    Drug use: Not on file    Sexual activity: Not on file   Other Topics Concern    Not on file   Social History Narrative    Not on file     Social Determinants of Health     Financial Resource Strain: Not on file   Food Insecurity: Not on file   Transportation Needs: Not on file   Physical Activity: Not on file   Stress: Not on file   Social Connections: Not on file   Intimate Partner Violence: Not on file   Housing Stability: Not on file     Social History     Social History Narrative    Not on file        Social History    None             OBJECTIVE:     Mental Status Evaluation:    Appearance age appropriate, casually dressed   Behavior pleasant, cooperative   Speech normal volume, normal pitch   Mood "anxious"   Affect Mood-congruent, full   Thought Processes Coherent, organized, goal-directed   Associations intact associations   Thought Content normal   Perceptual Disturbances: none   Abnormal Thoughts  Risk Potential Suicidal ideation - None  Homicidal ideation - None  Potential for aggression - No   Orientation oriented to person, place, date and situation   Memory recent and remote memory grossly intact   Consciousness alert and awake   Attention Span attention span and concentration are age appropriate   Intellect Not formally assessed   Insight intact   Judgement intact    Muscle Strength and  Gait muscle strength and tone were normal, gait normal   Language no difficulty naming common objects   Fund of Knowledge displays adequate knowledge of current events             The patient's chart was reviewed for relevant lab reports and recent encounters with other providers  Medications, treatment progress and treatment plan were reviewed with the patient         Assessment/Plan:       Diagnoses and all orders for this visit:    Bipolar 1 disorder, depressed, partial remission ([facility] [address] )    Marital conflict          Treatment Recommendations/Precautions:    Continue current medications:               alprazolam 0 25 mg tablet - take 1/2 - 1  tablets by mouth two times daily if needed for anxiety/sleep               lamotrigine 25 mg tablet - take 2 tablets by mouth daily at bedtime              aripiprazole 15 mg tablet - take 1 tablet by mouth every day      Risks/Benefits       Risks, Benefits And Possible Side Effects Of Medications:     Risks, benefits, and possible side effects of medications explained to patient and patient verbalizes understanding and agreement for treatment     Controlled Medication Discussion:      MARIA DOLORES BRICE  website data was reviewed with pt   Discussed with patient the risks of sedation, respiratory depression, impairment of ability to drive and potential for abuse and addiction related to treatment with ALPRAZOLAM  The patient understands risk of treatment with ALPRAZOLAM, agrees to not drive if feeling impaired, not to request early refills, to take medication as prescribed, to not drink alcohol when taking it, and to not share it with others

## 2022-01-25 NOTE — PATIENT INSTRUCTIONS
Continue current medications:               alprazolam 0 25 mg tablet - take 1/2 - 1  tablets by mouth two times daily if needed for anxiety/sleep               lamotrigine 25 mg tablet - take 2 tablets by mouth daily at bedtime              aripiprazole 15 mg tablet - take 1 tablet by mouth every day

## 2022-03-08 ENCOUNTER — SOCIAL WORK (OUTPATIENT)
Dept: BEHAVIORAL/MENTAL HEALTH CLINIC | Facility: CLINIC | Age: 52
End: 2022-03-08
Payer: COMMERCIAL

## 2022-03-08 DIAGNOSIS — F31.30 BIPOLAR I DISORDER, MOST RECENT EPISODE DEPRESSED (HCC): Primary | ICD-10-CM

## 2022-03-08 PROCEDURE — 90834 PSYTX W PT 45 MINUTES: CPT

## 2022-03-08 NOTE — BH TREATMENT PLAN
Cecy Tao  1970       Date of Initial Treatment Plan: 9/2/2021   Date of Current Treatment Plan: 03/08/22    Treatment Plan Number 2     Strengths/Personal Resources for Self Care: compassionate and helpful    Diagnosis:   1  Bipolar I disorder, most recent episode depressed ([facility] [address] )         Area of Needs: relationship issues      Long Term Goal 1: Act would like to better manage depression    Target Date: N/A  Completion Date: N/A         Short Term Objectives for Goal 1: Act will attend counsleing and medication appointments, Tami Gutierrez will consider marriage counseling and Cct will make an effort to focus on self  Long Term Goal 2: N/A    Target Date: N/A  Completion Date: N/A    Short Term Objectives for Goal 2: N/A         Long Term Goal # 3: N/A     Target Date: N/A  Completion Date: N/A    Short Term Objectives for Goal 3: N/A    GOAL 1: Modality: Individual 2x per month   Completion Date 8/8/2022    Behavioral Health Treatment Plan [facility]: Diagnosis and Treatment Plan explained to Nguyen Clubs relates understanding diagnosis and is agreeable to Treatment Plan         Client Comments : Please share your thoughts, feelings, need and/or experiences regarding your treatment plan: ct agreed

## 2022-03-17 ENCOUNTER — SOCIAL WORK (OUTPATIENT)
Dept: BEHAVIORAL/MENTAL HEALTH CLINIC | Facility: CLINIC | Age: 52
End: 2022-03-17
Payer: COMMERCIAL

## 2022-03-17 DIAGNOSIS — F31.76 BIPOLAR I DISORDER, MOST RECENT EPISODE DEPRESSED, IN FULL REMISSION (HCC): Primary | ICD-10-CM

## 2022-03-17 PROCEDURE — 90834 PSYTX W PT 45 MINUTES: CPT

## 2022-03-17 NOTE — PSYCH
Psychotherapy Provided: Individual Psychotherapy 45 minutes     Length of time in session: 45 minutes, follow up in 2 week    Encounter Diagnosis     ICD-10-CM    1  Bipolar I disorder, most recent episode depressed, in full remission ([facility] [address] )  F31 76        Goals addressed in session: Goal 1     Pain:      none    0    Current suicide risk : Low     D- ct shared that she is struggling with her  and Cory Cowart  Ct does not feel that anything will ever happen with Cory Cowart but she will become jealous if he gets involved in a relationship  Ct shared that her  is treating her like a child  Ct may push back on him  Ct was made aware of possibly what he is seeing and experiencing  Ct does feel some guilt  Ct reports that oldest daughter was caught stealing at school from another student  Ct daughter received 3 lunch suspensions and returned the property  A- ct presented with moderate anxiety  Ct also expressed guilt and conflict  Ct was verbal and engaged in session  Ct sleep is adequate  P- ct will attend session, take med's as instructed, and use techniques to manage moods and work on marriage    [address]: Diagnosis and Treatment Plan explained to Francine Hall relates understanding diagnosis and is agreeable to Treatment Plan   Yes

## 2022-03-22 ENCOUNTER — SOCIAL WORK (OUTPATIENT)
Dept: BEHAVIORAL/MENTAL HEALTH CLINIC | Facility: CLINIC | Age: 52
End: 2022-03-22
Payer: COMMERCIAL

## 2022-03-22 DIAGNOSIS — F31.76 BIPOLAR I DISORDER, MOST RECENT EPISODE DEPRESSED, IN FULL REMISSION (HCC): Primary | ICD-10-CM

## 2022-03-22 PROCEDURE — 90834 PSYTX W PT 45 MINUTES: CPT

## 2022-03-22 NOTE — PSYCH
Psychotherapy Provided: Individual Psychotherapy 45 minutes     Length of time in session: 45 minutes, follow up in 2 week    Encounter Diagnosis     ICD-10-CM    1  Bipolar I disorder, most recent episode depressed, in full remission ([facility] [address] )  F31 74        Goals addressed in session: Goal 1     Pain:      none    0    Current suicide risk : Low     D- ct shared that she was shocked to learn that youngest daughter had a boyfriend for 6-7 months  Ct had asked daughter about this boy that she was spending time with and daughter denied it  Ct realizes it was an innocent relationship but did not like the fact that daughter lied  Ct  also asked her to confront there friend about his lack of follow through which ct did do recently  A- ct presented with moderate depressed mood  Ct feels like a failure as a mom  Ct co worker is negative and makes ct feel poorly about herself at times  Ct was verbal and engaged in session  Ct sleep is inconsistent  P- ct will attend session, take med's as instructed, and use techniques to manage moods  Behavioral Health Treatment Plan [facility]: Diagnosis and Treatment Plan explained to Beatris Benavides relates understanding diagnosis and is agreeable to Treatment Plan   Yes

## 2022-03-24 ENCOUNTER — HOSPITAL ENCOUNTER (EMERGENCY)
Facility: HOSPITAL | Age: 52
Discharge: HOME | End: 2022-03-24
Attending: EMERGENCY MEDICINE | Admitting: EMERGENCY MEDICINE
Payer: COMMERCIAL

## 2022-03-24 ENCOUNTER — APPOINTMENT (EMERGENCY)
Dept: RADIOLOGY | Facility: HOSPITAL | Age: 52
End: 2022-03-24
Payer: COMMERCIAL

## 2022-03-24 VITALS
WEIGHT: 259 LBS | OXYGEN SATURATION: 100 % | SYSTOLIC BLOOD PRESSURE: 118 MMHG | TEMPERATURE: 97.9 F | DIASTOLIC BLOOD PRESSURE: 66 MMHG | RESPIRATION RATE: 12 BRPM | BODY MASS INDEX: 44.22 KG/M2 | HEART RATE: 74 BPM | HEIGHT: 64 IN

## 2022-03-24 DIAGNOSIS — R07.9 CHEST PAIN: Primary | ICD-10-CM

## 2022-03-24 LAB
2HR DELTA HS TROPONIN: 1 NG/L
ALBUMIN SERPL BCP-MCNC: 3.6 G/DL (ref 3.5–5)
ALP SERPL-CCNC: 116 U/L (ref 46–116)
ALT SERPL W P-5'-P-CCNC: 25 U/L (ref 12–78)
ANION GAP SERPL CALCULATED.3IONS-SCNC: 7 MMOL/L (ref 4–13)
APTT PPP: 29 SECONDS (ref 23–37)
AST SERPL W P-5'-P-CCNC: 17 U/L (ref 5–45)
BASOPHILS # BLD AUTO: 0.02 THOUSANDS/ΜL (ref 0–0.1)
BASOPHILS NFR BLD AUTO: 0 % (ref 0–1)
BILIRUB SERPL-MCNC: 0.53 MG/DL (ref 0.2–1)
BUN SERPL-MCNC: 17 MG/DL (ref 5–25)
CALCIUM SERPL-MCNC: 8.6 MG/DL (ref 8.3–10.1)
CARDIAC TROPONIN I PNL SERPL HS: 2 NG/L
CARDIAC TROPONIN I PNL SERPL HS: 3 NG/L
CHLORIDE SERPL-SCNC: 107 MMOL/L (ref 100–108)
CO2 SERPL-SCNC: 28 MMOL/L (ref 21–32)
CREAT SERPL-MCNC: 0.82 MG/DL (ref 0.6–1.3)
EOSINOPHIL # BLD AUTO: 0.16 THOUSAND/ΜL (ref 0–0.61)
EOSINOPHIL NFR BLD AUTO: 2 % (ref 0–6)
ERYTHROCYTE [DISTWIDTH] IN BLOOD BY AUTOMATED COUNT: 12.7 % (ref 11.6–15.1)
GFR SERPL CREATININE-BSD FRML MDRD: 81 ML/MIN/1.73SQ M
GLUCOSE SERPL-MCNC: 84 MG/DL (ref 65–140)
HCT VFR BLD AUTO: 48.2 % (ref 34.8–46.1)
HGB BLD-MCNC: 15.2 G/DL (ref 11.5–15.4)
IMM GRANULOCYTES # BLD AUTO: 0.07 THOUSAND/UL (ref 0–0.2)
IMM GRANULOCYTES NFR BLD AUTO: 1 % (ref 0–2)
INR PPP: 1.01 (ref 0.84–1.19)
LYMPHOCYTES # BLD AUTO: 2.52 THOUSANDS/ΜL (ref 0.6–4.47)
LYMPHOCYTES NFR BLD AUTO: 28 % (ref 14–44)
MCH RBC QN AUTO: 28.1 PG (ref 26.8–34.3)
MCHC RBC AUTO-ENTMCNC: 31.5 G/DL (ref 31.4–37.4)
MCV RBC AUTO: 89 FL (ref 82–98)
MONOCYTES # BLD AUTO: 0.57 THOUSAND/ΜL (ref 0.17–1.22)
MONOCYTES NFR BLD AUTO: 6 % (ref 4–12)
NEUTROPHILS # BLD AUTO: 5.77 THOUSANDS/ΜL (ref 1.85–7.62)
NEUTS SEG NFR BLD AUTO: 63 % (ref 43–75)
NRBC BLD AUTO-RTO: 0 /100 WBCS
PLATELET # BLD AUTO: 264 THOUSANDS/UL (ref 149–390)
PMV BLD AUTO: 10.3 FL (ref 8.9–12.7)
POTASSIUM SERPL-SCNC: 3.8 MMOL/L (ref 3.5–5.3)
PROT SERPL-MCNC: 6.7 G/DL (ref 6.4–8.2)
PROTHROMBIN TIME: 13.1 SECONDS (ref 11.6–14.5)
RBC # BLD AUTO: 5.4 MILLION/UL (ref 3.81–5.12)
SODIUM SERPL-SCNC: 142 MMOL/L (ref 136–145)
WBC # BLD AUTO: 9.11 THOUSAND/UL (ref 4.31–10.16)

## 2022-03-24 PROCEDURE — 85025 COMPLETE CBC W/AUTO DIFF WBC: CPT | Performed by: EMERGENCY MEDICINE

## 2022-03-24 PROCEDURE — 36415 COLL VENOUS BLD VENIPUNCTURE: CPT | Performed by: EMERGENCY MEDICINE

## 2022-03-24 PROCEDURE — 93005 ELECTROCARDIOGRAM TRACING: CPT

## 2022-03-24 PROCEDURE — 80053 COMPREHEN METABOLIC PANEL: CPT | Performed by: EMERGENCY MEDICINE

## 2022-03-24 PROCEDURE — 85610 PROTHROMBIN TIME: CPT | Performed by: EMERGENCY MEDICINE

## 2022-03-24 PROCEDURE — 99285 EMERGENCY DEPT VISIT HI MDM: CPT

## 2022-03-24 PROCEDURE — 84484 ASSAY OF TROPONIN QUANT: CPT | Performed by: EMERGENCY MEDICINE

## 2022-03-24 PROCEDURE — 71045 X-RAY EXAM CHEST 1 VIEW: CPT

## 2022-03-24 PROCEDURE — 99285 EMERGENCY DEPT VISIT HI MDM: CPT | Performed by: EMERGENCY MEDICINE

## 2022-03-24 PROCEDURE — 85730 THROMBOPLASTIN TIME PARTIAL: CPT | Performed by: EMERGENCY MEDICINE

## 2022-03-24 NOTE — ED PROVIDER NOTES
History  Chief Complaint   Patient presents with    Chest Pain     was at work and started having cp that radiated to her jaw and left arm, has since subsided  took 81mg asa     Patient states she was in her normal state health this morning at work when she felt a sudden onset of pain in the center of the chest rising up to the throat and jaw  Pain started hour 15 minutes prior to arrival   Patient tried several maneuvers including sitting drinking water and taking a baby aspirin prior to arrival   She states that the pain has resolved since arriving  She experienced no nausea vomiting diaphoresis, no shortness of breath, no belching  She states she has never had chest pain like this in the past   Patient is a light smoker but has no other significant coronary artery disease risks  She has never had a stress test           Prior to Admission Medications   Prescriptions Last Dose Informant Patient Reported? Taking? ALPRAZolam (XANAX) 0 25 mg tablet   No No   Sig: Take 0 5-1 tablets (0 125-0 25 mg total) by mouth 2 (two) times a day as needed for anxiety or sleep   ARIPiprazole (ABILIFY) 15 mg tablet   No No   Sig: Take 1 tablet (15 mg total) by mouth daily   lamoTRIgine (LaMICtal) 25 mg tablet   No No   Sig: Take 2 tablets (50 mg total) by mouth daily at bedtime   levothyroxine 75 mcg tablet   Yes No   Sig: Take 75 mcg by mouth daily      Facility-Administered Medications: None       Past Medical History:   Diagnosis Date    Bipolar 1 disorder, depressed ([facility] [address] )        Past Surgical History:   Procedure Laterality Date    CHOLECYSTECTOMY         History reviewed  No pertinent family history  I have reviewed and agree with the history as documented      E-Cigarette/Vaping     E-Cigarette/Vaping Substances     Social History     Tobacco Use    Smoking status: Former Smoker    Smokeless tobacco: Never Used   Substance Use Topics    Alcohol use: Not on file    Drug use: Not on file       Review of Systems Constitutional: Negative for chills and fever  HENT: Negative for congestion, sore throat and trouble swallowing  Eyes: Negative for visual disturbance  Respiratory: Negative for cough and shortness of breath  Cardiovascular: Positive for chest pain  Negative for palpitations and leg swelling  Gastrointestinal: Negative for abdominal pain, diarrhea, nausea and vomiting  Genitourinary: Negative for dysuria  Skin: Negative for color change and rash  Neurological: Negative for weakness and headaches  Hematological: Does not bruise/bleed easily  Psychiatric/Behavioral: Negative for confusion  All other systems reviewed and are negative  Physical Exam  Physical Exam  Vitals and nursing note reviewed  Constitutional:       Appearance: She is obese  HENT:      Head: Normocephalic  Eyes:      Pupils: Pupils are equal, round, and reactive to light  Cardiovascular:      Rate and Rhythm: Normal rate and regular rhythm  Heart sounds: Normal heart sounds  Pulmonary:      Effort: Pulmonary effort is normal       Breath sounds: Normal breath sounds  Chest:      Chest wall: No tenderness  Abdominal:      General: Bowel sounds are normal       Tenderness: There is no abdominal tenderness  Musculoskeletal:      Cervical back: Normal range of motion and neck supple  Right lower leg: No tenderness  No edema  Left lower leg: No tenderness  No edema  Skin:     General: Skin is warm and dry  Capillary Refill: Capillary refill takes less than 2 seconds  Neurological:      General: No focal deficit present  Mental Status: She is alert and oriented to person, place, and time     Psychiatric:         Mood and Affect: Mood normal          Behavior: Behavior normal          Vital Signs  ED Triage Vitals [03/24/22 1221]   Temperature Pulse Respirations Blood Pressure SpO2   97 9 °F (36 6 °C) 78 18 148/78 98 %      Temp src Heart Rate Source Patient Position - Orthostatic VS BP Location FiO2 (%)   -- -- -- -- --      Pain Score       2           Vitals:    03/24/22 1221 03/24/22 1430   BP: 148/78 118/66   Pulse: 78 74         Visual Acuity      ED Medications  Medications - No data to display    Diagnostic Studies  Results Reviewed     Procedure Component Value Units Date/Time    HS Troponin I 2hr [[id number]]  (Normal) Collected: 03/24/22 1528    Lab Status: Final result Specimen: Blood from Arm, Right Updated: 03/24/22 1555     hs TnI 2hr 3 ng/L      Delta 2hr hsTnI 1 ng/L     HS Troponin I 4hr [[id number]]     Lab Status: No result Specimen: Blood     HS Troponin 0hr (reflex protocol) [[id number]]  (Normal) Collected: 03/24/22 1233    Lab Status: Final result Specimen: Blood from Arm, Right Updated: 03/24/22 1304     hs TnI 0hr 2 ng/L     Comprehensive metabolic panel [[id number]] Collected: 03/24/22 1233    Lab Status: Final result Specimen: Blood from Arm, Right Updated: 03/24/22 1256     Sodium 142 mmol/L      Potassium 3 8 mmol/L      Chloride 107 mmol/L      CO2 28 mmol/L      ANION GAP 7 mmol/L      BUN 17 mg/dL      Creatinine 0 82 mg/dL      Glucose 84 mg/dL      Calcium 8 6 mg/dL      AST 17 U/L      ALT 25 U/L      Alkaline Phosphatase 116 U/L      Total Protein 6 7 g/dL      Albumin 3 6 g/dL      Total Bilirubin 0 53 mg/dL      eGFR 81 ml/min/1 73sq m     Narrative:      Mohan guidelines for Chronic Kidney Disease (CKD):     Stage 1 with normal or high GFR (GFR > 90 mL/min/1 73 square meters)    Stage 2 Mild CKD (GFR = 60-89 mL/min/1 73 square meters)    Stage 3A Moderate CKD (GFR = 45-59 mL/min/1 73 square meters)    Stage 3B Moderate CKD (GFR = 30-44 mL/min/1 73 square meters)    Stage 4 Severe CKD (GFR = 15-29 mL/min/1 73 square meters)    Stage 5 End Stage CKD (GFR <15 mL/min/1 73 square meters)  Note: GFR calculation is accurate only with a steady state creatinine    Protime-INR [[id number]]  (Normal) Collected: 03/24/22 1233    Lab Status: Final result Specimen: Blood from Arm, Right Updated: 03/24/22 1251     Protime 13 1 seconds      INR 1 01    APTT [[id number]]  (Normal) Collected: 03/24/22 1233    Lab Status: Final result Specimen: Blood from Arm, Right Updated: 03/24/22 1251     PTT 29 seconds     CBC and differential [[id number]]  (Abnormal) Collected: 03/24/22 1233    Lab Status: Final result Specimen: Blood from Arm, Right Updated: 03/24/22 1240     WBC 9 11 Thousand/uL      RBC 5 40 Million/uL      Hemoglobin 15 2 g/dL      Hematocrit 48 2 %      MCV 89 fL      MCH 28 1 pg      MCHC 31 5 g/dL      RDW 12 7 %      MPV 10 3 fL      Platelets 128 Thousands/uL      nRBC 0 /100 WBCs      Neutrophils Relative 63 %      Immat GRANS % 1 %      Lymphocytes Relative 28 %      Monocytes Relative 6 %      Eosinophils Relative 2 %      Basophils Relative 0 %      Neutrophils Absolute 5 77 Thousands/µL      Immature Grans Absolute 0 07 Thousand/uL      Lymphocytes Absolute 2 52 Thousands/µL      Monocytes Absolute 0 57 Thousand/µL      Eosinophils Absolute 0 16 Thousand/µL      Basophils Absolute 0 02 Thousands/µL                  XR chest 1 view portable    (Results Pending)              Procedures  ECG 12 Lead Documentation Only    Date/Time: 3/24/2022 12:24 PM  Performed by: Hillary Ruiz MD  Authorized by: Hillary Ruiz MD     Indications / Diagnosis:  Chest pain  ECG reviewed by me, the ED Provider: yes    Patient location:  ED  Interpretation:     Interpretation: non-specific    Rate:     ECG rate:  76    ECG rate assessment: normal    Rhythm:     Rhythm: sinus rhythm    Ectopy:     Ectopy: none    QRS:     QRS axis:  Normal    QRS intervals:  Normal  Conduction:     Conduction: normal    ST segments:     ST segments:  Normal  T waves:     T waves: normal    Other findings:     Other findings: LVH               ED Course             HEART Risk Score      Most Recent Value   Heart Score Risk Calculator    History 1 Filed at: 03/24/2022 1630   ECG 0 Filed at: 03/24/2022 1630   Age 1 Filed at: 03/24/2022 1630   Risk Factors 1 Filed at: 03/24/2022 1630   Troponin 0 Filed at: 03/24/2022 1630   HEART Score 3 Filed at: 03/24/2022 1630                        SBIRT 22yo+      Most Recent Value   SBIRT (25 yo +)    In order to provide better care to our patients, we are screening all of our patients for alcohol and drug use  Would it be okay to ask you these screening questions? No Filed at: 03/24/2022 1225                    MDM  Number of Diagnoses or Management Options  Diagnosis management comments: Patient had her 1st episode of chest pain  Cardiac risk factors low  Will check cardiac profile      Disposition  Final diagnoses:   Chest pain     Time reflects when diagnosis was documented in both MDM as applicable and the Disposition within this note     Time User Action Codes Description Comment    3/24/2022  4:31 PM Jr Blanc Add [R07 9] Chest pain       ED Disposition     ED Disposition Condition Date/Time Comment    Discharge Stable u Mar 24, 2022  4:31 PM Cecy Tao discharge to home/self care  Follow-up Information     Follow up With Specialties Details Why Jaime Mathis 89, DO Cardiology Schedule an appointment as soon as possible for a visit in 1 day  [address] [facility]  [phone]            Patient's Medications   Discharge Prescriptions    No medications on file       No discharge procedures on file      PDMP Review       Value Time User    PDMP Reviewed  Yes 1/25/2022  7:56 PM ABDIRAHMAN Kemp          ED Provider  Electronically Signed by           Ann Dubin, MD  03/24/22 [id number]

## 2022-03-24 NOTE — Clinical Note
Gudelia Rosas was seen and treated in our emergency department on 3/24/2022  Diagnosis:     Devin Tong  may return to work on return date  She may return on this date: 03/26/2022         If you have any questions or concerns, please don't hesitate to call        Jed Pineda MD    ______________________________           _______________          _______________  Hospital Representative                              Date                                Time

## 2022-03-27 LAB
ATRIAL RATE: 76 BPM
P AXIS: 15 DEGREES
PR INTERVAL: 138 MS
QRS AXIS: -7 DEGREES
QRSD INTERVAL: 82 MS
QT INTERVAL: 362 MS
QTC INTERVAL: 407 MS
T WAVE AXIS: 25 DEGREES
VENTRICULAR RATE: 76 BPM

## 2022-03-27 PROCEDURE — 93010 ELECTROCARDIOGRAM REPORT: CPT | Performed by: INTERNAL MEDICINE

## 2022-03-28 ENCOUNTER — CONSULT (OUTPATIENT)
Dept: CARDIOLOGY CLINIC | Facility: CLINIC | Age: 52
End: 2022-03-28
Payer: COMMERCIAL

## 2022-03-28 VITALS
BODY MASS INDEX: 44.22 KG/M2 | OXYGEN SATURATION: 95 % | HEIGHT: 64 IN | WEIGHT: 259 LBS | SYSTOLIC BLOOD PRESSURE: 110 MMHG | TEMPERATURE: 97.4 F | DIASTOLIC BLOOD PRESSURE: 82 MMHG | HEART RATE: 99 BPM

## 2022-03-28 DIAGNOSIS — E03.9 ACQUIRED HYPOTHYROIDISM: ICD-10-CM

## 2022-03-28 DIAGNOSIS — F17.210 CONTINUOUS DEPENDENCE ON CIGARETTE SMOKING: ICD-10-CM

## 2022-03-28 DIAGNOSIS — F31.70 BIPOLAR AFFECTIVE DISORDER IN REMISSION (HCC): ICD-10-CM

## 2022-03-28 DIAGNOSIS — R07.9 CHEST PAIN WITH LOW RISK OF ACUTE CORONARY SYNDROME: Primary | ICD-10-CM

## 2022-03-28 DIAGNOSIS — E66.01 MORBID OBESITY (HCC): ICD-10-CM

## 2022-03-28 PROCEDURE — 99204 OFFICE O/P NEW MOD 45 MIN: CPT | Performed by: INTERNAL MEDICINE

## 2022-03-28 NOTE — PATIENT INSTRUCTIONS
1  We have ordered a treadmill exercise stress test along with lipid panel and TSH level and will assist you in scheduling the treadmill exercise stress test at [facility] in the not too distant future  2   We will contact you with the results of those studies as they become available  3  Using a four day in a row sequence, and after obtaining an [employer] blood pressure unit from [employer] or [facility], do blood pressures at home morning and evening for four consecutive days, three readings at a time one or 2 minutes apart, preceded by a minimum of 15 minutes of rest and relaxation  4   Take blood pressures after resting for at least 15 minutes while seated in a chair or at a table with arm supported on arm rest or table  Do 3 readings, one after the other, both morning and evening for 4 consecutive days  5   Write down each and every reading with date and time and get list of readings to the office of Dr Davion Ontiveros for his review  You may e-mail the readings as an attachment to: [email or web address] or you may send them as an attachment to a message to Dr Davion Ontiveros through the [facility] [employer] Chart application  4   Our office will contact you with further instructions and the results of this review

## 2022-03-28 NOTE — PROGRESS NOTES
Cardiology Office Consultation   Cecy Tao 46 y o  female MRN: [id number]  03/28/22  6:25 PM        Assessment/Plan     1  Single episode of stress-induced substernal chest pain, somewhat atypical, on 03/24/2022 with no prior subsequent episodes  2  Chronic morbid obesity with 84 pound weight gain since gastric bypass 14 years ago  3  Longstanding bipolar disorder, treated for seven years with current regimen  4  Chronic acquired hypothyroidism with patient off therapy for about three months, owing to non adherence  5  Continuous dependence on low-dose cigarette smoking  6  Recent history of labile blood pressures  Plan and   Patient Instructions     1  We have ordered a treadmill exercise stress test along with lipid panel and TSH level and will assist you in scheduling the treadmill exercise stress test at [facility] in the not too distant future  2   We will contact you with the results of those studies as they become available  3  Using a four day in a row sequence, and after obtaining an [employer] blood pressure unit from [address] [facility] [address] or [address] [facility], do blood pressures at home morning and evening for four consecutive days, three readings at a time one or 2 minutes apart, preceded by a minimum of 15 minutes of rest and relaxation  4   Take blood pressures after resting for at least 15 minutes while seated in a chair or at a table with arm supported on arm rest or table  Do 3 readings, one after the other, both morning and evening for 4 consecutive days  5   Write down each and every reading with date and time and get list of readings to the office of Dr Deven Garzon for his review  You may e-mail the readings as an attachment to: [email or web address] or you may send them as an attachment to a message to Dr Deven Garzon through the [facility] [employer] Chart application  4   Our office will contact you with further instructions and the results of this review        Current Outpatient Medications   Medication Sig Dispense Refill    ALPRAZolam (XANAX) 0 25 mg tablet Take 0 5-1 tablets (0 125-0 25 mg total) by mouth 2 (two) times a day as needed for anxiety or sleep 60 tablet 0    ARIPiprazole (ABILIFY) 15 mg tablet Take 1 tablet (15 mg total) by mouth daily 90 tablet 0    lamoTRIgine (LaMICtal) 25 mg tablet Take 2 tablets (50 mg total) by mouth daily at bedtime 180 tablet 0     No current facility-administered medications for this visit  History of Present Illness     Physician Requesting Consult:  Lawrence Metcalf MD/[facility]      Reason for Consult / Principal Problem:  Chest pain      HPI:      Dawit Quigley is a 46y o  year old female who presents for cardiology consultation and follow-up regarding a recent evaluation at [facility] Emergency Department on 03/24/2022  At that time, she presented on the afternoon of that day complaining of sudden onset of burning and sharp substernal chest pain with radiation to the throat and jaw, which started shortly after she received a stressful call from her daughter's school, being told that her daughter was being placed on in school suspension because of theft  She was also stressed about having to use paper charts instead of computerized records 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  She estimates that the duration of her pain was between 45 and 60 minutes  She had tried drinking water and taking a baby aspirin prior to emergency room arrival   There was no nausea, vomiting, diaphoresis, palpitations, syncope, near syncope, weakness, or pallor  The patient claims she has had no subsequent episodes or any prior episodes before the event on 03/24/2022    She was pain-free by the time of her arrival at the hospital     The patient has a history of chronic morbid obesity, acquired hypothyroidism with patient off of her thyroid supplementation medication for approximately three months  She also is treated chronically for bipolar disorder for the past seven years  The patient follows up with [facility] every three months  Additionally, the patient is a long-term smoker, but smokes between four and six cigarettes per day  She denies any history of hypertension and diabetes mellitus  She has not had any recent lipid panels  She states that she has recently had some elevated blood pressure readings  The patient's primary care provider is Maru Acosta PA-C, working at [facility] [employer] in [address]  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 and currently works the 7 a m  to 3 p m  shift  The patient lives home with her  and two daughters ages 13 and 6  She also has a 57-year-old son who is in college  The patient admits to smoking 4-6 cigarettes daily, rarely uses alcohol and denies use of illicit drugs  The patient's mother and sister both had hypertension but her mother is alive at age 80  One of her sisters and a brother have cancer  Her father  at age 76years old and developed Alzheimer's dementia near the end of his life  The patient's past surgical history includes arthroscopic left knee surgery 34 years ago and remote cholecystectomy  The patient also had gastric bypass about 14 years ago and lost weight from her maximum weight of 300 lbs to her lowest weight of 175 lbs  She has slowly regained 84 lbs since then            Historical Information   Past Medical History:   Diagnosis Date    Bipolar 1 disorder, depressed ([address] )        Past Surgical History:  Past Surgical History:   Procedure Laterality Date    CHOLECYSTECTOMY       Social History     Substance and Sexual Activity   Alcohol Use None     Social History     Substance and Sexual Activity   Drug Use Not on file     Social History     Tobacco Use   Smoking Status Former Smoker   Smokeless Tobacco Never Used     History reviewed  No pertinent family history  Meds/Allergies   Prior to Admission medications    Medication Sig Start Date End Date Taking? Authorizing Provider   ALPRAZolam Darryn Nidavid) 0 25 mg tablet Take 0 5-1 tablets (0 125-0 25 mg total) by mouth 2 (two) times a day as needed for anxiety or sleep 1/25/22  Yes ABDIRAHMAN Rojas   ARIPiprazole (ABILIFY) 15 mg tablet Take 1 tablet (15 mg total) by mouth daily 1/25/22  Yes ABDIRAHMAN Rojas   lamoTRIgine (LaMICtal) 25 mg tablet Take 2 tablets (50 mg total) by mouth daily at bedtime 1/25/22  Yes ABDIRAHMAN Rojas   levothyroxine 75 mcg tablet Take 75 mcg by mouth daily  Patient not taking: Reported on 3/28/2022  11/4/20 3/28/22  Historical Provider, MD     Allergies   Allergen Reactions    Penicillins Blisters       Cardiovascular ROS:   More than 10 systems reviewed and all systems negative, except as noted in history of present illness    Objective     VITALS:   Blood pressure 110/82, pulse 99, temperature (!) 97 4 °F (36 3 °C), height 5' 4" (1 626 m), weight 117 kg (259 lb), SpO2 95 %  Body mass index is 44 46 kg/m²  Physical Exam      General-Well-developed morbidly obese   female  in no acute distress  Patient is alert, oriented X3 and cooperative  Skin-Warm and dry with no pallor, rashes, ecchymoses, lesions  HEENT-Normocephalic  Pupils equally round and reactive to light and accommodation  Extraocular muscles intact  Mucous membranes pink and moist  Sclerae nonicteric  Optic fundi poorly seen  Neck-No jugular venous distention, AJR, masses, thyromegaly, adenopathy, bruits  Lungs-No rales, rhonchi, wheezes  Heart-No murmur, gallop, rub, click, heave, thrill  Abdomen-Normal bowel sounds with no masses, organomegaly, guarding, tenderness, or rigidity  Markedly obese abdomen  Extremities-No cyanosis, clubbing, edema, pulse decrease  Bilateral lower extremities are obese  Neurological-No focal neurological signs  EKG 03/24/22:     Normal sinus rhythm at 76 bpm   Minimal voltage criteria for LVH   Otherwise normal ECG      IMAGING:    Chest x-ray, one-view portable 03/24/2022:    No acute cardiopulmonary disease      LAB REVIEW:    Lab Results   Component Value Date    SODIUM 142 03/24/2022    K 3 8 03/24/2022     03/24/2022    CO2 28 03/24/2022    BUN 17 03/24/2022    CREATININE 0 82 03/24/2022    CALCIUM 8 6 03/24/2022    AST 17 03/24/2022    ALT 25 03/24/2022    ALKPHOS 116 03/24/2022    EGFR 81 03/24/2022     CBC, PT/PTT, HS troponin at 0 and 2 hours, CMP on 03/24/2022: All normal      No results found for: CHOLESTEROL  No results found for: HDL  No results found for: LDLCHOLEST  No results found for: LDLCALC  No components found for: DIRECTLDLREFLEX  No results found for: DER7RRKBTCYL  No results found for: TRIG          Total office time spent today 62  minutes           Lynn Christine MD

## 2022-03-31 ENCOUNTER — SOCIAL WORK (OUTPATIENT)
Dept: BEHAVIORAL/MENTAL HEALTH CLINIC | Facility: CLINIC | Age: 52
End: 2022-03-31
Payer: COMMERCIAL

## 2022-03-31 DIAGNOSIS — F31.76 BIPOLAR I DISORDER, MOST RECENT EPISODE DEPRESSED, IN FULL REMISSION (HCC): Primary | ICD-10-CM

## 2022-03-31 PROCEDURE — 90834 PSYTX W PT 45 MINUTES: CPT

## 2022-03-31 NOTE — PSYCH
Psychotherapy Provided: Individual Psychotherapy 45 minutes     Length of time in session: 45 minutes, follow up in 2 week    Encounter Diagnosis     ICD-10-CM    1  Bipolar I disorder, most recent episode depressed, in full remission ([facility] [address] )  F31 76        Goals addressed in session: Goal 1     Pain:      none    0    Current suicide risk : Low     D- ct shared that she was in the Ed last Thursday due to severe chest pain  Ct was taken from work via ambulance  Ct early tests revealed no issues  Ct saw cardiologist and will have a stress test done soon  Ct is stressed with oldest daughter who steals and is manipulative  This daughter was suspended from school for 2 days recently  Ct feels  is not supportive especially in coping with oldest daughter  Ct son was diagnosed with bipolar disorder and ct is upset about that also  A- ct presented with moderate depressed mood  Ct is also anxious  Ct is back to work  Ct was verbal and engaged in session  Ct sleep is adequate  P- ct will attend session, take med's  As instructed, and use techniques to manage moods  Behavioral Health Treatment Plan [facility]: Diagnosis and Treatment Plan explained to Anival Soler relates understanding diagnosis and is agreeable to Treatment Plan   Yes

## 2022-04-19 ENCOUNTER — OFFICE VISIT (OUTPATIENT)
Dept: PSYCHIATRY | Facility: CLINIC | Age: 52
End: 2022-04-19
Payer: COMMERCIAL

## 2022-04-19 VITALS — BODY MASS INDEX: 44.59 KG/M2 | WEIGHT: 261.2 LBS | HEIGHT: 64 IN

## 2022-04-19 DIAGNOSIS — F31.9 BIPOLAR I DISORDER, CURRENT EPISODE DEPRESSED (HCC): Primary | ICD-10-CM

## 2022-04-19 DIAGNOSIS — Z63.0 MARITAL CONFLICT: ICD-10-CM

## 2022-04-19 DIAGNOSIS — F41.9 ANXIETY: ICD-10-CM

## 2022-04-19 PROBLEM — F31.75 BIPOLAR 1 DISORDER, DEPRESSED, PARTIAL REMISSION (HCC): Status: ACTIVE | Noted: 2022-04-19

## 2022-04-19 PROCEDURE — 90833 PSYTX W PT W E/M 30 MIN: CPT | Performed by: NURSE PRACTITIONER

## 2022-04-19 PROCEDURE — 99214 OFFICE O/P EST MOD 30 MIN: CPT | Performed by: NURSE PRACTITIONER

## 2022-04-19 RX ORDER — ALPRAZOLAM 0.25 MG/1
.125-.25 TABLET ORAL 2 TIMES DAILY PRN
Qty: 60 TABLET | Refills: 0 | Status: SHIPPED | OUTPATIENT
Start: 2022-04-19

## 2022-04-19 RX ORDER — BUSPIRONE HYDROCHLORIDE 7.5 MG/1
7.5 TABLET ORAL 2 TIMES DAILY
Qty: 60 TABLET | Refills: 2 | Status: SHIPPED | OUTPATIENT
Start: 2022-04-19 | End: 2022-07-15 | Stop reason: SDUPTHER

## 2022-04-19 RX ORDER — ARIPIPRAZOLE 15 MG/1
15 TABLET ORAL DAILY
Qty: 90 TABLET | Refills: 0 | Status: SHIPPED | OUTPATIENT
Start: 2022-04-19 | End: 2022-07-15 | Stop reason: SDUPTHER

## 2022-04-19 RX ORDER — LAMOTRIGINE 25 MG/1
50 TABLET ORAL
Qty: 180 TABLET | Refills: 0 | Status: SHIPPED | OUTPATIENT
Start: 2022-04-19 | End: 2022-07-15 | Stop reason: SDUPTHER

## 2022-04-19 SDOH — SOCIAL STABILITY - SOCIAL INSECURITY: PROBLEMS IN RELATIONSHIP WITH SPOUSE OR PARTNER: Z63.0
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
## 2022-04-19 NOTE — PATIENT INSTRUCTIONS
Start: buspirone 7 5 mg tablet - take 1 tablet by mouth two times every day    Continue current medications:               alprazolam 0 25 mg tablet - take 1/2 - 1  tablets by mouth two times daily if needed for anxiety/sleep               lamotrigine 25 mg tablet - take 2 tablets by mouth daily at bedtime              aripiprazole 15 mg tablet - take 1 tablet by mouth every day

## 2022-04-19 NOTE — BH TREATMENT PLAN
TREATMENT PLAN         [facility]    Name and Date of Birth:  Kehinde Hernandez 46 y o  1970    Date of Treatment Plan: April 19, 2022    Diagnosis/Diagnoses:    1  Bipolar I disorder, current episode depressed ([address] )    2  Marital conflict    3  Anxiety        Strengths/Personal Resources for Self-Care: taking medications as prescribed, ability to adapt to life changes, ability to communicate well, motivation for treatment      Area/Areas of need (in own words): depression, anxiety, mood instability  1          Long Term Goal:  decrease depression and anxiety  Target date - 10/19/2022  Person/Persons responsible for completion of goal: татьяна Sam     2          Short Term Objective (s) - How will we reach this goal?:   A  Provider new recommended medication/dosage changes and/or continue medication(s): continue current medications as prescribed  Abilify, BuSpar, Lamictal, Xanax  B  take medications as prescribed, attend scheduled appts  C continue to see [facility] for psychotherapy  Target date - 10/19/2022  Person/Persons Responsible for Completion of Goal: татьяна Sam     Progress Towards Goals: regressed     Treatment Modality: medication management every 12 weeks     Review due 120 - 180 days from date of this plan: 10/19/2022  Expected length of service: ongoing treatment  My Physician/PA/NP and I have developed this plan together and I agree to work on the goals and objectives  I understand the treatment goals that were developed for my treatment

## 2022-04-21 ENCOUNTER — SOCIAL WORK (OUTPATIENT)
Dept: BEHAVIORAL/MENTAL HEALTH CLINIC | Facility: CLINIC | Age: 52
End: 2022-04-21
Payer: COMMERCIAL

## 2022-04-21 DIAGNOSIS — F31.76 BIPOLAR I DISORDER, MOST RECENT EPISODE DEPRESSED, IN FULL REMISSION (HCC): Primary | ICD-10-CM

## 2022-04-21 PROCEDURE — 90834 PSYTX W PT 45 MINUTES: CPT

## 2022-04-21 NOTE — PSYCH
Psychotherapy Provided: Individual Psychotherapy 45 minutes     Length of time in session: 45 minutes, follow up in 2 week    Encounter Diagnosis     ICD-10-CM    1  Bipolar I disorder, most recent episode depressed, in full remission ([facility] [address] )  F31 76        Goals addressed in session: Goal 1     Pain:      none    0    Current suicide risk : Low     D-ct shared that she is unhappy with  and marriage  Ct feels that they are disconnected  Ct reports that they argued about oldest daughter and ct wants  to be more involved  Ct in home counselor for daughter may meet with the couple  Ct was encouraged to give it a chance to make things work  Ct will get stress test next week and had a minor episode of chest pain when she got the call that the oldest daughter was suspended again  A- ct presented with moderate level of anxiety and mild depressed mood  Ct was tearful at times talking about marriage  Ct was verbal and engaged in session  P- ct will attend session, take med's as instructed, and use techniques to manage moods  Behavioral Health Treatment Plan [facility]: Diagnosis and Treatment Plan explained to Leonel Denis relates understanding diagnosis and is agreeable to Treatment Plan   Yes

## 2022-05-12 ENCOUNTER — SOCIAL WORK (OUTPATIENT)
Dept: BEHAVIORAL/MENTAL HEALTH CLINIC | Facility: CLINIC | Age: 52
End: 2022-05-12
Payer: COMMERCIAL

## 2022-05-12 DIAGNOSIS — F31.76 BIPOLAR I DISORDER, MOST RECENT EPISODE DEPRESSED, IN FULL REMISSION (HCC): Primary | ICD-10-CM

## 2022-05-12 PROCEDURE — 90834 PSYTX W PT 45 MINUTES: CPT

## 2022-05-12 NOTE — PSYCH
Psychotherapy Provided: Individual Psychotherapy 45 minutes     Length of time in session: 45 minutes, follow up in 2 week    Encounter Diagnosis     ICD-10-CM    1  Bipolar I disorder, most recent episode depressed, in full remission ([facility] [address] )  F31 76        Goals addressed in session: Goal 1     Pain:      none    0    Current suicide risk : Low     D- ct shared that her  found out she was giving daily rides to family friends and threatened divorce  Ct is enabling family friend and she realizes that to some degree but is emotionally involved  Ct is uncomfortable and is afraid family friend will relapse  Ct will still stay in touch with him via text frequently throughout the day  Ct is uncomfortable with the idea of pulling back more from him  Ct reports that oldest daughter is still acting out but ct is trying to be less strict to se if that will help  A- ct presented with mild depressed mood and anxiety  Ct was verbal and engaged in session  Ct sleep is inconsistent  P- ct will attend session, take med's as instructed, and use techniques to manage moods  Behavioral Health Treatment Plan [facility]: Diagnosis and Treatment Plan explained to Kaylyn Alatorre relates understanding diagnosis and is agreeable to Treatment Plan   Yes

## 2022-05-26 ENCOUNTER — SOCIAL WORK (OUTPATIENT)
Dept: BEHAVIORAL/MENTAL HEALTH CLINIC | Facility: CLINIC | Age: 52
End: 2022-05-26
Payer: COMMERCIAL

## 2022-05-26 DIAGNOSIS — F31.76 BIPOLAR I DISORDER, MOST RECENT EPISODE DEPRESSED, IN FULL REMISSION (HCC): Primary | ICD-10-CM

## 2022-05-26 PROCEDURE — 90834 PSYTX W PT 45 MINUTES: CPT

## 2022-05-26 NOTE — PSYCH
Psychotherapy Provided: Individual Psychotherapy 45 minutes     Length of time in session: 45 minutes, follow up in 2 week    Encounter Diagnosis     ICD-10-CM    1  Bipolar I disorder, most recent episode depressed, in full remission ([facility] [address] )  F31 76        Goals addressed in session: Goal 1     Pain:      none    0    Current suicide risk : Low     D- ct shared that she and  had another argument about family friend  Family friend is biological father of ct adopted daughter  Ct has feelings for this person and helps him out a lot  Ct  wants ct to stop helping so  Much because he feels this person needs to be more independent  This family friend is in recovery and is on methadone maintenance  Ct acknowledges that this friend needs to be more independent but she struggles with her feelings  A- ct presented with mild agitation and irritation in regards to   Ct was verbal and engaged in session  Ct sleep is adequate  P- ct will attend session, take med's aw instructed, and use techniques to manage moods and improve communication  Behavioral Health Treatment Plan [facility]: Diagnosis and Treatment Plan explained to Fern Osler relates understanding diagnosis and is agreeable to Treatment Plan   Yes

## 2022-06-07 ENCOUNTER — OFFICE VISIT (OUTPATIENT)
Dept: PSYCHIATRY | Facility: CLINIC | Age: 52
End: 2022-06-07
Payer: COMMERCIAL

## 2022-06-07 VITALS — WEIGHT: 251.2 LBS | HEIGHT: 64 IN | BODY MASS INDEX: 42.88 KG/M2

## 2022-06-07 DIAGNOSIS — F31.9 BIPOLAR I DISORDER, CURRENT EPISODE DEPRESSED (HCC): Primary | ICD-10-CM

## 2022-06-07 DIAGNOSIS — F41.9 ANXIETY: ICD-10-CM

## 2022-06-07 PROCEDURE — 99214 OFFICE O/P EST MOD 30 MIN: CPT | Performed by: NURSE PRACTITIONER
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
## 2022-06-07 NOTE — PATIENT INSTRUCTIONS
Continue current medications:               alprazolam 0 25 mg tablet - take 1/2 - 1  tablets by mouth two times daily if needed for anxiety/sleep               aripiprazole 15 mg tablet - take 1 tablet by mouth every day     buspirone 7 5 mg tablet - take 1 tablet by mouth two times every day              lamotrigine 25 mg tablet - take 2 tablets by mouth daily at bedtime

## 2022-06-09 ENCOUNTER — SOCIAL WORK (OUTPATIENT)
Dept: BEHAVIORAL/MENTAL HEALTH CLINIC | Facility: CLINIC | Age: 52
End: 2022-06-09
Payer: COMMERCIAL

## 2022-06-09 DIAGNOSIS — F31.76 BIPOLAR I DISORDER, MOST RECENT EPISODE DEPRESSED, IN FULL REMISSION (HCC): Primary | ICD-10-CM

## 2022-06-09 PROCEDURE — 90834 PSYTX W PT 45 MINUTES: CPT

## 2022-06-09 NOTE — PSYCH
Psychotherapy Provided: Individual Psychotherapy 45 minutes     Length of time in session: 45 minutes, follow up in 2 week    Encounter Diagnosis     ICD-10-CM    1  Bipolar I disorder, most recent episode depressed, in full remission ([facility] [address] )  F31 76        Goals addressed in session: Goal 1     Pain:      none    0    Current suicide risk : Low     D-Ct shared that she and  had an argument last Saturday morning  Ct did not say anything and "zoned out"  Ct reports that the next day  was very nice and acted as if everything was fine  Ct is still picking up Whit Pj for his appointments and from work at times  Ct reports that girls are ok although they are challenging  Ct reports that a patient  at work last weekend  Ct is upset when there family members are present otherwise she handles that part of her job well  A- ct presented with moderate anxiety  Ct reports that she feels her moods fluctuating  This may have to do with her and  relationship  Ct was verbal and engaged in session  Ct sleep is adequate  P- ct will attend session, take med's as instructed, and use techniques to manage moods  Behavioral Health Treatment Plan [facility]: Diagnosis and Treatment Plan explained to Tariq Geiger relates understanding diagnosis and is agreeable to Treatment Plan   Yes

## 2022-07-05 ENCOUNTER — SOCIAL WORK (OUTPATIENT)
Dept: BEHAVIORAL/MENTAL HEALTH CLINIC | Facility: CLINIC | Age: 52
End: 2022-07-05
Payer: COMMERCIAL

## 2022-07-05 DIAGNOSIS — F31.76 BIPOLAR I DISORDER, MOST RECENT EPISODE DEPRESSED, IN FULL REMISSION (HCC): Primary | ICD-10-CM

## 2022-07-05 PROCEDURE — 90834 PSYTX W PT 45 MINUTES: CPT

## 2022-07-14 ENCOUNTER — TELEPHONE (OUTPATIENT)
Dept: PSYCHIATRY | Facility: CLINIC | Age: 52
End: 2022-07-14

## 2022-07-14 NOTE — TELEPHONE ENCOUNTER
busPIRone (BUSPAR) 7 5 mg tablet~Take 1 tablet (7 5 mg total) by mouth 2 (two) times a day,      ARIPiprazole (ABILIFY) 15 mg tablet~Take 1 tablet (15 mg total) by mouth daily,    lamoTRIgine (LaMICtal) 25 mg tablet~  Take 2 tablets (50 mg total) by mouth daily at bedtime,      [facility] [id number] [place] [facility], [address] [place] [address] [facility]    appt 7/19/22

## 2022-07-15 DIAGNOSIS — F31.9 BIPOLAR I DISORDER, CURRENT EPISODE DEPRESSED (HCC): ICD-10-CM

## 2022-07-15 DIAGNOSIS — F41.9 ANXIETY: ICD-10-CM

## 2022-07-15 RX ORDER — ALPRAZOLAM 0.25 MG/1
.125-.25 TABLET ORAL 2 TIMES DAILY PRN
Qty: 60 TABLET | Refills: 0 | Status: CANCELLED | OUTPATIENT
Start: 2022-07-15

## 2022-07-18 RX ORDER — LAMOTRIGINE 25 MG/1
50 TABLET ORAL
Qty: 180 TABLET | Refills: 0 | Status: SHIPPED | OUTPATIENT
Start: 2022-07-18 | End: 2022-10-14 | Stop reason: SDUPTHER

## 2022-07-18 RX ORDER — BUSPIRONE HYDROCHLORIDE 7.5 MG/1
7.5 TABLET ORAL 2 TIMES DAILY
Qty: 60 TABLET | Refills: 2 | Status: SHIPPED | OUTPATIENT
Start: 2022-07-18 | End: 2022-10-14 | Stop reason: SDUPTHER

## 2022-07-18 RX ORDER — ARIPIPRAZOLE 15 MG/1
15 TABLET ORAL DAILY
Qty: 90 TABLET | Refills: 0 | Status: SHIPPED | OUTPATIENT
Start: 2022-07-18

## 2022-07-19 ENCOUNTER — TELEPHONE (OUTPATIENT)
Dept: BEHAVIORAL/MENTAL HEALTH CLINIC | Facility: CLINIC | Age: 52
End: 2022-07-19

## 2022-07-26 ENCOUNTER — TELEPHONE (OUTPATIENT)
Dept: PSYCHIATRY | Facility: CLINIC | Age: 52
End: 2022-07-26

## 2022-07-26 NOTE — TELEPHONE ENCOUNTER
I cancelled appointment on 8/9/2022 with Tiffanie Wheeler due to provider going on vacation  Patient will call us back because she is on vacation and will reschedule appointment

## 2022-08-04 ENCOUNTER — SOCIAL WORK (OUTPATIENT)
Dept: BEHAVIORAL/MENTAL HEALTH CLINIC | Facility: CLINIC | Age: 52
End: 2022-08-04
Payer: COMMERCIAL

## 2022-08-04 DIAGNOSIS — F31.9 BIPOLAR I DISORDER, CURRENT EPISODE DEPRESSED (HCC): Primary | ICD-10-CM

## 2022-08-04 PROCEDURE — 90834 PSYTX W PT 45 MINUTES: CPT

## 2022-08-04 NOTE — BH TREATMENT PLAN
Cecy Tao  1970       Date of Initial Treatment Plan: 9/2/2021   Date of Current Treatment Plan: 08/04/22    Treatment Plan Number 3     Strengths/Personal Resources for Self Care: compassionate and caring    Diagnosis:   1  Bipolar I disorder, current episode depressed ([facility] [address] )         Area of Needs: relationship issues      Long Term Goal 1: Act will work on managing relationships    Target Date: N/A  Completion Date: N/A         Short Term Objectives for Goal 1: Act will managing time with friend and  better, Francois Davis will use compromise with  in regards to spending time with friend and Cct will continue to work on talking care of self by making time for herself    Long Term Goal 2: N/A    Target Date: N/A  Completion Date: N/A    Short Term Objectives for Goal 2: N/A         Long Term Goal # 3: N/A     Target Date: N/A  Completion Date: N/A    Short Term Objectives for Goal 3: N/A    GOAL 1: Modality: Individual 2x per month   Completion Date 1/4/2023    [address]: Diagnosis and Treatment Plan explained to Racheal Aldrich relates understanding diagnosis and is agreeable to Treatment Plan         Client Comments : Please share your thoughts, feelings, need and/or experiences regarding your treatment plan: ct agreed

## 2022-08-04 NOTE — PSYCH
Psychotherapy Provided: Individual Psychotherapy 45 minutes     Length of time in session: 45 minutes, follow up in 2 week    Encounter Diagnosis     ICD-10-CM    1  Bipolar I disorder, current episode depressed ([facility] [address] )  F31 9        Goals addressed in session: Goal 1     Pain:      none    0    Current suicide risk : Low     D- ct shared that her friend whom she has feelings for is moving to a better situation  Ct  does not want him at there house as much   does not know that ct has developed feelings for friend but seems suspicious based on requests  This friend usually sleeps over on weekends to be with his 15 y/o daughter whom ct and  adopted when she was a baby  Ct reports that family went on vacation and it went well  Ct reports that work is stressful but that is usually the case  Ct is not willing to make changes in her relationships  A- ct presented with moderate level of anxiety over her relationship issues  Ct was verbal and engaged in session  Ct sleep is poor  Ct has trouble staying asleep  P- ct will attend session, take med's as instructed, and use techniques to manage moods and improve communication  Behavioral Health Treatment Plan [facility]: Diagnosis and Treatment Plan explained to Tejinder Gomez relates understanding diagnosis and is agreeable to Treatment Plan   Yes

## 2022-08-23 ENCOUNTER — SOCIAL WORK (OUTPATIENT)
Dept: BEHAVIORAL/MENTAL HEALTH CLINIC | Facility: CLINIC | Age: 52
End: 2022-08-23
Payer: COMMERCIAL

## 2022-08-23 DIAGNOSIS — F31.9 BIPOLAR I DISORDER, CURRENT EPISODE DEPRESSED (HCC): Primary | ICD-10-CM

## 2022-08-23 PROCEDURE — 90834 PSYTX W PT 45 MINUTES: CPT
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
## 2022-08-31 ENCOUNTER — OFFICE VISIT (OUTPATIENT)
Dept: PSYCHIATRY | Facility: CLINIC | Age: 52
End: 2022-08-31

## 2022-08-31 VITALS — BODY MASS INDEX: 42.88 KG/M2 | WEIGHT: 251.2 LBS | HEIGHT: 64 IN

## 2022-08-31 DIAGNOSIS — F41.9 ANXIETY: ICD-10-CM

## 2022-08-31 DIAGNOSIS — F31.9 BIPOLAR I DISORDER, CURRENT EPISODE DEPRESSED (HCC): Primary | ICD-10-CM

## 2022-08-31 NOTE — PSYCH
This note was not shared with the patient due to privacy exception: note includes other individuals    Candace      Name and Date of Birth:  Micah Conti 45 y o  1970    Date of Visit: 08/31/22      Micah Conti was seen today for medication management and brief psychotherapy  Office Regular Visit    Throughout this appointment, COVID-19 precautions were followed at all times: masks were worn by this patient and the provider, social distancing was maintained, hand-washing was performed before and after appointment, and the provider's room was ventilated before this patient entered the room and after this patient left it          Time spent on psychotherapy:  minutes    Treatment modality:   Medication management   Medication education  Supportive psychotherapy  Encouraged and reinforced adaptive behavior  Stress management  Sleep hygiene  Work-related stress  Coping with grief  Help identify feelings/emotions/life stressors  Explored signs and symptoms of substance abuse  COVID-19 education/vaccination information        SUBJECTIVE: pt reports taking psychiatric medications as prescribed, denies side effects  Still has shakes inside, doesn't know what causes it, if it's because of her home life or something else  This morning Tejinder Bee was asking her 20 questions about why she was going to work early  [employer] under a towel or umbrella when she was in [facility], didn't get much of a rash  Eating fine  Broken sleep more often than not, has no problem falling asleep, approx 6 hours/night no effect on functioning  Seeing Chiarmaan [facility] for psychotherapy        6/7/2022: taking medication as prescribed, denies side effects  Was anxious and depressed until last weekend when Tejinder Bee had a complete turnaround, he has stopped being upset about pt and Micha Pali and as a result pt is confused because she doesn't know what Tejinder Bee wants from her  Pt says moods are greatly affected by Aida Torres and by Ellen Quiroga, as a result she feels nervous all the time, doesn't know what she could do about that to stop feeling nervous  He gets along well with Katie Carver (although Severiano March is her birth father) but not with Ana   She doesn't take alprazolam every day  Doesn't want to change the doses of any of her medications, knows she will be red because she will be going to the beach and her legs will have red welts on them, and a little on her face and arms and chest, although she uses sun screen all over  Sleep variable - depends on what is going on  Didn't look into videos of progressive relaxation and breathing  Continue current medications/doses:              alprazolam 0 25 mg tablet - take 1/2 - 1  tablets by mouth two times daily if needed for anxiety/sleep               aripiprazole 15 mg tablet - take 1 tablet by mouth every day               buspirone 7 5 mg tablet - take 1 tablet by mouth two times every day              lamotrigine 25 mg tablet - take 2 tablets by mouth daily at bedtime         She denies suicidal ideation, intent or plan at present, has no suicidal ideation, intent or plan at present  She denies any auditory hallucinations and visual hallucinations, denies any other delusional thinking, denies any delusional thinking  She denies any side effects from medications      HPI ROS Appetite Changes and Sleep:   Appetite - normal    Sleep - broken, adequate    Review Of Systems:      Constitutional Negative   ENT Negative   Cardiovascular Negative   Respiratory Negative   Gastrointestinal Negative   Genitourinary Negative   Musculoskeletal Negative   Integumentary Negative   Neurological Negative   Endocrine Negative   Other Symptoms Negative and None       Laboratory Results: No results found for this or any previous visit      Substance Abuse History:    Social History     Substance and Sexual Activity   Drug Use Not on file       Family Psychiatric History: No family history on file      The following portions of the patient's history were reviewed and updated as appropriate: past family history, past medical history, past social history, past surgical history and problem list     Social History     Socioeconomic History   • Marital status: /Civil Union     Spouse name: Not on file   • Number of children: Not on file   • Years of education: Not on file   • Highest education level: Not on file   Occupational History   • Not on file   Tobacco Use   • Smoking status: Former Smoker   • Smokeless tobacco: Never Used   Substance and Sexual Activity   • Alcohol use: Not on file   • Drug use: Not on file   • Sexual activity: Not on file   Other Topics Concern   • Not on file   Social History Narrative   • Not on file     Social Determinants of Health     Financial Resource Strain: Not on file   Food Insecurity: Not on file   Transportation Needs: Not on file   Physical Activity: Not on file   Stress: Not on file   Social Connections: Not on file   Intimate Partner Violence: Not on file   Housing Stability: Not on file     Social History     Social History Narrative   • Not on file        Social History     Tobacco History     Smoking Status  Former Smoker    Smokeless Tobacco Use  Never Used          Alcohol History     Alcohol Use Status  Not Asked          Drug Use     Drug Use Status  Not Asked          Sexual Activity     Sexually Active  Not Asked          Activities of Daily Living    Not Asked                     OBJECTIVE:     Mental Status Evaluation:    Appearance age appropriate, casually dressed   Behavior pleasant, cooperative   Speech normal rate, rhythm, volume   Mood Somewhat anxious    Affect Normal range and intensity, appropriate   Thought Processes Organized, goal-directed   Associations intact associations   Thought Content No overt delusions   Perceptual Disturbances: No auditory hallucinations, no visual hallucinations   Abnormal Thoughts  Risk Potential Suicidal ideation - None  Homicidal ideation - None  Potential for aggression - No   Orientation oriented to person, place, date and situation   Memory recent and remote memory grossly intact   Consciousness alert and awake   Attention Span attention span and concentration are age appropriate   Intellect Not formally assessed   Insight intact   Judgement intact    Muscle Strength and  Gait muscle strength and tone were normal, gait normal   Language no difficulty naming common objects, repeating a phrase   Fund of Knowledge displays adequate knowledge of current events, past history, vocabulary               The patient's chart was reviewed for relevant lab reports and recent encounters with other providers  Medications, treatment progress, and current treatment plan that was enacted on 4/19/2022 and due for review/update on 10/19/2022 were reviewed with the patient  The treatment plan was updated today with the patient who verbally agreed with the updated plan  Today's treatment plan is due for review/update NLT 3/1/2023  Pt and writer were unable to sign plan because signature pad is not working      Treatment plan goals discussed in this encounter: decrease depression and anxiety        Assessment/Plan:       Diagnoses and all orders for this visit:    Bipolar I disorder, current episode depressed ([facility] [address] )    Anxiety          Treatment Recommendations/Precautions:    Continue current medications/doses:              alprazolam 0 25 mg tablet - take 1/2 - 1  tablets by mouth two times daily if needed for anxiety/sleep               aripiprazole 15 mg tablet - take 1 tablet by mouth every day               buspirone 7 5 mg tablet - take 1 tablet by mouth two times every day              lamotrigine 25 mg tablet - take 2 tablets by mouth daily at bedtime        Risks/Benefits       Risks, Benefits And Possible Side Effects Of Medications:     Risks, benefits, and possible side effects of medications explained to patient and patient verbalizes understanding and agreement for treatment     Controlled Medication Discussion:      MARIA DOLORES BRICE  website data was reviewed with pt   Discussed with patient the risks of sedation, respiratory depression, impairment of ability to drive and potential for abuse and addiction related to treatment with ALPRAZOLAM  The patient understands risk of treatment with ALPRAZOLAM, agrees to not drive if feeling impaired, not to request early refills, to take medication as prescribed, to not drink alcohol when taking it, and to not share it with others

## 2022-08-31 NOTE — BH TREATMENT PLAN
TREATMENT PLAN         [facility]    Name and Date of Birth:  Regina Escobar 45 y o  1970    Date of Treatment Plan: August 31, 2022    Diagnosis/Diagnoses:    1  Bipolar I disorder, current episode depressed ([facility] [address] )    2  Anxiety          Strengths/Personal Resources for Self-Care: taking medications as prescribed, ability to adapt to life changes, ability to communicate well, motivation for treatment      Area/Areas of need (in own words): depression, anxiety, mood instability  1          Long Term Goal:  decrease depression and anxiety  Target date - 3/1/2023  Person/Persons responsible for completion of goal: татьяна Sam     2          Short Term Objective (s) - How will we reach this goal?:   A  Provider new recommended medication/dosage changes and/or continue medication(s): continue current medications as prescribed  Abilify, BuSpar, Lamictal, Xanax  B  take medications as prescribed, attend scheduled appts  C continue to see [facility] for psychotherapy  Target date - 3/1/2023  Person/Persons Responsible for Completion of Goal: татьяна Sam     Progress Towards Goals: slight improvement     Treatment Modality: medication management every 12 weeks     Review due 120 - 180 days from date of this plan:3/1/2023  Expected length of service: ongoing treatment  My Physician/PA/NP and I have developed this plan together and I agree to work on the goals and objectives  I understand the treatment goals that were developed for my treatment

## 2022-09-05 RX ORDER — ALPRAZOLAM 0.25 MG/1
.125-.25 TABLET ORAL 2 TIMES DAILY PRN
Qty: 60 TABLET | Refills: 0 | Status: SHIPPED | OUTPATIENT
Start: 2022-09-05

## 2022-09-06 ENCOUNTER — SOCIAL WORK (OUTPATIENT)
Dept: BEHAVIORAL/MENTAL HEALTH CLINIC | Facility: CLINIC | Age: 52
End: 2022-09-06
Payer: COMMERCIAL

## 2022-09-06 DIAGNOSIS — F31.9 BIPOLAR I DISORDER, CURRENT EPISODE DEPRESSED (HCC): Primary | ICD-10-CM

## 2022-09-06 PROCEDURE — 90834 PSYTX W PT 45 MINUTES: CPT

## 2022-09-06 NOTE — PSYCH
Psychotherapy Provided: Individual Psychotherapy 45 minutes     Length of time in session: 45 minutes, follow up in 2 week    Encounter Diagnosis     ICD-10-CM    1  Bipolar I disorder, current episode depressed ([facility] [address] )  F31 9        Goals addressed in session: Goal 1     Pain:      none    0    Current suicide risk : Low     D- ct shared that she is upset because she feels her friend maybe relapsing on substances  Ct is preoccupied on what she should do  Ct was encouraged to have boundaries and not get enmeshed in friends use  Ct knows when friend uses because they disappear and have no money  Ct was encouraged to take care of herself and her family  Ct 2 daughter started school and ct got a p/c from school regarding older daughter  Ct reports that her and  are doing well and will celebrate there 30 st wedding anniversary this week  A_ ct presented with mild depressed mood and anxiety  Ct was less verbal in session and preoccupied  Ct sleep is inconsistent  P- ct will attend session, take med's as instructed,a and use techniques to manage moods  Behavioral Health Treatment Plan [facility]: Diagnosis and Treatment Plan explained to Anival Soler relates understanding diagnosis and is agreeable to Treatment Plan   Yes

## 2022-09-20 ENCOUNTER — SOCIAL WORK (OUTPATIENT)
Dept: BEHAVIORAL/MENTAL HEALTH CLINIC | Facility: CLINIC | Age: 52
End: 2022-09-20
Payer: COMMERCIAL

## 2022-09-20 DIAGNOSIS — F31.9 BIPOLAR I DISORDER, CURRENT EPISODE DEPRESSED (HCC): Primary | ICD-10-CM

## 2022-09-20 PROCEDURE — 90834 PSYTX W PT 45 MINUTES: CPT

## 2022-09-20 NOTE — PSYCH
Psychotherapy Provided: Individual Psychotherapy 45 minutes     Length of time in session: 45 minutes, follow up in 2 week    Encounter Diagnosis     ICD-10-CM    1  Bipolar I disorder, current episode depressed ([address] )  F31 9        Goals addressed in session: Goal 1     Pain:      none    0    Current suicide risk : Low     D-ct shared that things have been relatively calm  Ct reports that friend did not use and he has been at the house a lot recently because he and ct  enjoy football  Ct reports that  was only upset with there friend when he helped daughter cook something  Ct and  are going away this week for a day trip  Ct is hopeful that they will have a nice time and reconnect  Ct us frustrated with oldest daughter who is mis behaving  Ct reports that work has been calm despite losing new director of nursing who resigned after three weeks  A- ct presented with mild depressed mood  Ct was verbal and engaged in sleep  Ct sleep is poor and ct is over eating at times  P- ct will attend session, take med's as instructed, and use techniques to manage moods  Behavioral Health Treatment Plan [facility]: Diagnosis and Treatment Plan explained to Ting Farah relates understanding diagnosis and is agreeable to Treatment Plan   Yes

## 2022-10-04 ENCOUNTER — SOCIAL WORK (OUTPATIENT)
Dept: BEHAVIORAL/MENTAL HEALTH CLINIC | Facility: CLINIC | Age: 52
End: 2022-10-04
Payer: COMMERCIAL

## 2022-10-04 DIAGNOSIS — F31.9 BIPOLAR I DISORDER, CURRENT EPISODE DEPRESSED (HCC): Primary | ICD-10-CM

## 2022-10-04 PROCEDURE — 90834 PSYTX W PT 45 MINUTES: CPT

## 2022-10-04 NOTE — PSYCH
Psychotherapy Provided: Individual Psychotherapy 45 minutes     Length of time in session: 45 minutes, follow up in 2 week    Encounter Diagnosis     ICD-10-CM    1  Bipolar I disorder, current episode depressed ([facility] [address] )  F31 9        Goals addressed in session: Goal 1     Pain:      none    0    Current suicide risk : Low     D- ct shared that she went away with  and it went well  Ct  shared that another couple told him that ct was having an affair with daughter biological father  The bio father of ct daughter is adopted by ct but she is attracted to him and it is evident to others  Ct has never been physical with this man but there is a emotional connection  Ct  is of this week and ct cannot do what she normally does in regards to spending time with the daughters bio father  Ct is frustrated at work because there was a pay cut recently for some staff and that led to them leaving the job  A- ct presented with mild depressed mood and mild anxiety  Ct sleep is adequate  Ct was verbal and engaged in session  P- ct will attend session, take med's as instructed, and use techniques to manage moods  Behavioral Health Treatment Plan [facility]: Diagnosis and Treatment Plan explained to Edwina Diaz relates understanding diagnosis and is agreeable to Treatment Plan   Yes

## 2022-10-14 DIAGNOSIS — F31.9 BIPOLAR I DISORDER, CURRENT EPISODE DEPRESSED (HCC): ICD-10-CM

## 2022-10-14 DIAGNOSIS — F41.9 ANXIETY: ICD-10-CM

## 2022-10-14 RX ORDER — LAMOTRIGINE 25 MG/1
50 TABLET ORAL
Qty: 180 TABLET | Refills: 1 | Status: SHIPPED | OUTPATIENT
Start: 2022-10-14

## 2022-10-14 RX ORDER — BUSPIRONE HYDROCHLORIDE 7.5 MG/1
7.5 TABLET ORAL 2 TIMES DAILY
Qty: 180 TABLET | Refills: 1 | Status: SHIPPED | OUTPATIENT
Start: 2022-10-14

## 2022-10-18 ENCOUNTER — SOCIAL WORK (OUTPATIENT)
Dept: BEHAVIORAL/MENTAL HEALTH CLINIC | Facility: CLINIC | Age: 52
End: 2022-10-18
Payer: COMMERCIAL

## 2022-10-18 DIAGNOSIS — F31.9 BIPOLAR I DISORDER, CURRENT EPISODE DEPRESSED (HCC): ICD-10-CM

## 2022-10-18 DIAGNOSIS — F41.9 ANXIETY: Primary | ICD-10-CM

## 2022-10-18 PROCEDURE — 90834 PSYTX W PT 45 MINUTES: CPT

## 2022-10-18 NOTE — PSYCH
Psychotherapy Provided: Individual Psychotherapy 45 minutes     Length of time in session: 45 minutes, follow up in 2 week    Encounter Diagnosis     ICD-10-CM    1  Anxiety  F41 9    2  Bipolar I disorder, current episode depressed ([facility] [address] )  F31 9        Goals addressed in session: Goal 1     Pain:      none    0    Current suicide risk : Low     D-ct shared that her oldest daughter will see her birth mother and younger sister tonight  They have not sen each other in over 2 years  Birth mother actually called to make arrangement  Ct is not to concerned about because daughter really wants to see sister not so much her bio mother  Ct reports that she feels withdrawn from   Ct is connected emotionally with friend  Ct reports that work is stable and that there are no issues  Ct went to see oldest son football game and felt disconnected from others that were there possibly because her friend was there  A- ct presented with moderate depressed mood  Ct was verbal and engaged in session  Ct sleep is inconsistent  P- ct will attend session, take med's as instructed, and use technique to manage moods  Behavioral Health Treatment Plan [facility]: Diagnosis and Treatment Plan explained to Norah Guaman relates understanding diagnosis and is agreeable to Treatment Plan   YesVisit Time    Visit Start Time: 0447 AM  Visit Stop Time: 9481 AM  Total Visit Duration: 45 minutes

## 2022-10-28 ENCOUNTER — TELEPHONE (OUTPATIENT)
Dept: PSYCHIATRY | Facility: CLINIC | Age: 52
End: 2022-10-28

## 2022-10-28 NOTE — TELEPHONE ENCOUNTER
Patient is calling regarding cancelling an appointment. Date/Time: 11/01/22 18:27  Reason: conflict with another appt.      Patient was rescheduled: YES [] NO [x]  If yes, when was Patient reschedule for:     Patient requesting call back to reschedule: YES [] NO [x]

## 2022-10-31 NOTE — PATIENT INSTRUCTIONS
Continue current medications/doses:              alprazolam 0 25 mg tablet - take 1/2 - 1  tablets by mouth two times daily if needed for anxiety/sleep               aripiprazole 15 mg tablet - take 1 tablet by mouth every day               buspirone 7 5 mg tablet - take 1 tablet by mouth two times every day              lamotrigine 25 mg tablet - take 2 tablets by mouth daily at bedtime    Continue to see Annalisawenceslao Traore [facility] for psychotherapy

## 2022-11-01 ENCOUNTER — SOCIAL WORK (OUTPATIENT)
Dept: BEHAVIORAL/MENTAL HEALTH CLINIC | Facility: CLINIC | Age: 52
End: 2022-11-01

## 2022-11-01 DIAGNOSIS — F31.30 BIPOLAR I DISORDER, MOST RECENT EPISODE DEPRESSED (HCC): Primary | ICD-10-CM

## 2022-11-01 NOTE — PSYCH
Psychotherapy Provided: Individual Psychotherapy 45 minutes     Length of time in session: 45 minutes, follow up in 2 week    Encounter Diagnosis     ICD-10-CM    1  Bipolar I disorder, most recent episode depressed ([address] )  F31 30        Goals addressed in session: Goal 1     Pain:      none    0    Current suicide risk : Low     D-ct shared that she is getting frustrated with older daughter who is making mistakes and is getting into trouble oldest daughter will janki food, not throw out leftovers, and get into things at the house that are not hers  Ct did take this daughter to see her biological mother and half sister  Ct stated that it went well  Ct and  are doing alright  Ct will focus on talking more calmly however ct gets upset because she feels  is negative  Ct is enjoying friends company but is making sure it stays platonic  Ct is stressed because work is short staffed and ct is being reassigned to jobs that she is only getting because peers have threatened to quit if they have to do lesser jobs  A-ct presented with irritability regarding family and work issues  Ct was verbal and engaged in session  Ct sleep is inconsistent  P- ct will attend session, take med's as instructed, and use techniques to manage moods and improve communication  Behavioral Health Treatment Plan [facility]: Diagnosis and Treatment Plan explained to Juancarlos Due relates understanding diagnosis and is agreeable to Treatment Plan   Yes     Visit start and stop times:    11/01/22  Start Time: 0950  Stop Time: 1035  Total Visit Time: 45 minutes

## 2022-11-15 ENCOUNTER — SOCIAL WORK (OUTPATIENT)
Dept: BEHAVIORAL/MENTAL HEALTH CLINIC | Facility: CLINIC | Age: 52
End: 2022-11-15

## 2022-11-15 DIAGNOSIS — F31.30 BIPOLAR I DISORDER, MOST RECENT EPISODE DEPRESSED (HCC): Primary | ICD-10-CM

## 2022-11-15 NOTE — PSYCH
Psychotherapy Provided: Individual Psychotherapy 45 minutes     Length of time in session: 45 minutes, follow up in 2 week    Encounter Diagnosis     ICD-10-CM    1  Bipolar I disorder, most recent episode depressed ([address] )  F31 30        Goals addressed in session: Goal 1     Pain:      none    0    Current suicide risk : Low     D-ct shared that Bertha Crawley is in the ER possibly due to pneumonia  Ct reports that she is anxious about him and is hoping he is not using  Ct shared that work is stressful because she is doing the work of an aid and a nurse  Ct is exhausted after work due to increased activity  Ct and family are thinking of getting a new dog  Ct oldest daughter is stable but problematic  Ct also had to deal with a blocked kitchen sink  Ct reported that  had to remove 8 feet of pipe that was gunked up over the past 65 years  A- ct presented with moderate anxiety and mild depressed mood  Ct was verbal and engaged in session  Ct sleep is inconsistent  P- ct will attend session, take med's as instructed, and use techniques to manage moods  Behavioral Health Treatment Plan [facility]: Diagnosis and Treatment Plan explained to Racheal peng understanding diagnosis and is agreeable to Treatment Plan   Yes     Visit start and stop times:    11/15/22  Start Time: 8128  Stop Time: 9023  Total Visit Time: 45 minutes

## 2022-11-21 ENCOUNTER — TELEPHONE (OUTPATIENT)
Dept: PSYCHIATRY | Facility: CLINIC | Age: 52
End: 2022-11-21

## 2022-11-21 NOTE — TELEPHONE ENCOUNTER
Reached out to pt in regards to transfer of care from [facility] to a new provider, pt stated she already has a new provider

## 2022-11-29 ENCOUNTER — SOCIAL WORK (OUTPATIENT)
Dept: BEHAVIORAL/MENTAL HEALTH CLINIC | Facility: CLINIC | Age: 52
End: 2022-11-29

## 2022-11-29 DIAGNOSIS — F31.30 BIPOLAR I DISORDER, MOST RECENT EPISODE DEPRESSED (HCC): Primary | ICD-10-CM

## 2022-11-29 NOTE — PSYCH
Psychotherapy Provided: Individual Psychotherapy 48 minutes     Length of time in session: 48 minutes, follow up in 2 week    Encounter Diagnosis     ICD-10-CM    1  Bipolar I disorder, most recent episode depressed ([facility] [address] )  F31 30           Goals addressed in session: Goal 1     Pain:      none    0    Current suicide risk : Low     D-ct shared that her friend has been using 2 weeks prior to Thanksgiving  Ct shared that he has been in the ER a few times  Most recently he was admitted last night due to shortness of breath  Ct has feeling for him and cannot establish boundaries  Ct friend has not worked and ct paid for his methadone and cigarettes  Ct is encouraged to take care of herself and her family and to establish some boundaries  Ct also got a dog over the past 2 weeks but had to return it to the shelter due to the dog biting her one daughter and son  Ct work is stressful as they are understaffed  A- ct present with mild depressed mood and mild anxiety  Ct was verbal and engaged in session  Ct sleep is inconsistent  P- ct will attend session, take med's as instructed, and use techniques to manage moods and behavior  Behavioral Health Treatment Plan [facility]: Diagnosis and Treatment Plan explained to Doreen Saba relates understanding diagnosis and is agreeable to Treatment Plan   Yes     Visit start and stop times:    11/29/22  Start Time: 1102  Stop Time: 1150  Total Visit Time: 48 minutes

## 2022-12-07 NOTE — PSYCH
This note was not shared with the patient due to reasonable likelihood of causing patient harm    PROGRESS NOTE        Edouard Lockwood      Name and Date of Birth:  Arlene Cherry 45 y o  1970    Date of Visit: 12/08/22    SUBJECTIVE:    Cecy presents today for medication management and follow-up for bipolar disorder and anxiety  She is well-known to this practice and was seeing a medication provider here for the last 6 to 7 years  This provider has since retired and she is transitioning care  She also sees a therapist in this office every other week  Cecy reports that she initially presented for medication management due to "I was on the wrong meds "  She reports in the past she has been on several medications that made her mood symptoms worse such as Wellbutrin  She reports that she has been "more stable" for the last 5 years  She feels that her current regimen has been helpful for her mood, but she reports that she continues to have excessive anxiety  She reports that her BuSpar was initially helpful when started but has lost efficacy  Discussed a trial of increasing BuSpar to 10 mg twice a day for anxiety  Cecy reports that although her mood has been somewhat stable over the last few years that she feels her current regimen is causing some unwanted side effects  She reports that her appetite has been increased and she has been gaining weight  Her current BMI is 44 66  She also notes some photosensitivity and reports that that has been going on since she started her current regimen  This makes it difficult for outdoor activities  She states "if I can get on something that would be better for my weight and I would not have the photosensitivity that would be great "  Cecy also mentions that sleep has been difficult and she will often wake up through the night    She reports that this has been an ongoing issue and she has not found that any medication or sleep hygiene recommendation has been helpful  Cecy denies any history of inpatient psychiatric admission  She denies any history of head trauma, cardiac history, or other medical problems  She lives at home with her  and her 2 daughters aged 15 and 13  She notes that her son is away at college and he is 25years old  She also reports that her youngest daughter's biological father "mostly lives with us "  She reports that he is recovering from substance use  Cecy shares that this has been difficult for her and her 's relationship  She feels that her  is often jealous and this causes conflict  She does note that therapeutic intervention has been helpful  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  Prior to the last 5 years she had difficulty maintaining a job due to mental health concerns  She denies suicidal ideation, intent or plan at present, has no suicidal ideation, intent or plan at present  She denies any auditory hallucinations and visual hallucinations, denies any other delusional thinking, denies any delusional thinking  Natalia Antunez HPI ROS Appetite Changes and Sleep: increased appetite, decreased sleep (nighttime awakenings)    Review Of Systems:      Constitutional Negative   ENT Negative   Cardiovascular Negative   Respiratory Negative   Gastrointestinal Negative   Genitourinary Negative   Musculoskeletal Negative   Integumentary Negative   Neurological Negative   Endocrine Negative   Other Symptoms Negative and None       Laboratory Results: No results found for this or any previous visit  Substance Abuse History:    Social History     Substance and Sexual Activity   Drug Use Not on file       Family Psychiatric History:     No family history on file      The following portions of the patient's history were reviewed and updated as appropriate: past family history, past medical history, past social history, past surgical history and problem list     Social History     Socioeconomic History   • Marital status: /Civil Union     Spouse name: Not on file   • Number of children: Not on file   • Years of education: Not on file   • Highest education level: Not on file   Occupational History   • Not on file   Tobacco Use   • Smoking status: Former   • Smokeless tobacco: Never   Substance and Sexual Activity   • Alcohol use: Not on file   • Drug use: Not on file   • Sexual activity: Not on file   Other Topics Concern   • Not on file   Social History Narrative   • Not on file     Social Determinants of Health     Financial Resource Strain: Not on file   Food Insecurity: Not on file   Transportation Needs: Not on file   Physical Activity: Not on file   Stress: Not on file   Social Connections: Not on file   Intimate Partner Violence: Not on file   Housing Stability: Not on file     Social History     Social History Narrative   • Not on file        Social History     Tobacco History     Smoking Status  Former    Smokeless Tobacco Use  Never          Alcohol History     Alcohol Use Status  Not Asked          Drug Use     Drug Use Status  Not Asked          Sexual Activity     Sexually Active  Not Asked          Activities of Daily Living    Not Asked                     OBJECTIVE:     Mental Status Evaluation:    Appearance age appropriate, casually dressed   Behavior pleasant, cooperative, slightly guarded at times   Speech normal volume, normal pitch   Mood Anxious at times   Affect Mood Congruent    Thought Processes logical   Associations intact associations   Thought Content normal   Perceptual Disturbances: none   Abnormal Thoughts  Risk Potential Suicidal ideation - None  Homicidal ideation - None  Potential for aggression - No   Orientation oriented to person, place, time/date and situation   Memory recent and remote memory grossly intact   Cosciousness alert and awake   Attention Span attention span and concentration are age appropriate   Intellect Appears to be of Average Intelligence   Insight age appropriate    Judgement good    Muscle Strength and  Gait muscle strength and tone were normal   Language no difficulty naming common objects   Fund of Knowledge displays adequate knowledge of current events   Pain none   Pain Scale 0       Assessment/Plan:       Diagnoses and all orders for this visit:    Bipolar I disorder, current episode depressed (HCC)    Anxiety  -     busPIRone (BUSPAR) 10 mg tablet; Take 1 tablet (10 mg total) by mouth 2 (two) times a day          Treatment Recommendations/Precautions: Will increase BuSpar to 10 mg twice daily for anxiety    Continue other medication as prescribed:  Abilify 15 mg daily for mood  Lamictal 25 mg tablet 2 at bedtime (total dose 50 mg) for mood  Xanax 0 25 mg 1/2 to 1 tablet by mouth 2 times a day as needed for anxiety/sleep    Discussed Cecy's goal for treatment and reported that at the next appointment in 1 month we would discuss possible change to an alternative mood stabilizer that may be more weight neutral and not cause photosensitivity  She will call the office sooner if any questions or concerns arise  Will also discuss ordering lab studies (lipid panel & A1C) at next appointment, as there is no recent results in the chart  Risks/Benefits      Risks, Benefits And Possible Side Effects Of Medications:    Risks, benefits, and possible side effects of medications explained to patient and patient verbalizes understanding and agreement for treatment  Controlled Medication Discussion:     PDMP reviewed, patient uses Xanax sparingly and reports that she does not need a refill at this time  The patient understands the risk of treatment with Xanax  She agrees not to drive if feeling impaired, to take medication as prescribed, do not drink alcohol when taking this medicine, and to not share this medication with others      Psychotherapy Provided:     Individual psychotherapy provided: No

## 2022-12-08 ENCOUNTER — OFFICE VISIT (OUTPATIENT)
Dept: PSYCHIATRY | Facility: CLINIC | Age: 52
End: 2022-12-08

## 2022-12-08 VITALS — WEIGHT: 260.2 LBS | BODY MASS INDEX: 44.66 KG/M2

## 2022-12-08 DIAGNOSIS — F41.9 ANXIETY: ICD-10-CM

## 2022-12-08 DIAGNOSIS — F31.9 BIPOLAR I DISORDER, CURRENT EPISODE DEPRESSED (HCC): Primary | ICD-10-CM

## 2022-12-08 RX ORDER — BUSPIRONE HYDROCHLORIDE 10 MG/1
10 TABLET ORAL 2 TIMES DAILY
Qty: 60 TABLET | Refills: 1 | Status: SHIPPED | OUTPATIENT
Start: 2022-12-08 | End: 2023-02-06

## 2022-12-08 NOTE — BH TREATMENT PLAN
TREATMENT PLAN (Medication Management Only)        [facility]    Name and Date of Birth:  Christine Tabares 45 y o  1970  Date of Treatment Plan: December 8, 2022  Diagnosis/Diagnoses:    1  Bipolar I disorder, current episode depressed ([facility] [address] )    2  Anxiety      Strengths/Personal Resources for Self-Care: supportive family, taking medications as prescribed  Area/Areas of need (in own words): "remaining stable, while switching to a medicine that may have less side effects"  1  Long Term Goal: improve control of depression  Target Date:6 months - 6/8/2023  Person/Persons responsible for completion of goal: Cecy  2  Short Term Objective (s) - How will we reach this goal?:   A  Provider new recommended medication/dosage changes and/or continue medication(s): Buspar, Lamictal, Abilify, PRN Xanax  B  N/A   C  N/A  Target Date:6 months - 6/8/2023  Person/Persons Responsible for Completion of Goal: Cecy  Progress Towards Goals: continuing treatment  Treatment Modality: medication management every 3 months  Review due 180 days from date of this plan: 6 months - 6/8/2023  Expected length of service: ongoing treatment  My Physician/PA/NP and I have developed this plan together and I agree to work on the goals and objectives  I understand the treatment goals that were developed for my treatment

## 2022-12-13 ENCOUNTER — SOCIAL WORK (OUTPATIENT)
Dept: BEHAVIORAL/MENTAL HEALTH CLINIC | Facility: CLINIC | Age: 52
End: 2022-12-13

## 2022-12-13 DIAGNOSIS — F31.30 BIPOLAR I DISORDER, MOST RECENT EPISODE DEPRESSED (HCC): Primary | ICD-10-CM

## 2022-12-13 NOTE — PSYCH
Psychotherapy Provided: Individual Psychotherapy 42 minutes     Length of time in session: 42 minutes, follow up in 2 week    Encounter Diagnosis     ICD-10-CM    1  Bipolar I disorder, most recent episode depressed ([facility] [address] )  F31 30           Goals addressed in session: Goal 1     Pain:      none    0    Current suicide risk : Low     D- ct shared that family friend is recovered from his asthma and other lung condition  Ct reports that he is trying to cut down on smoking and is being difficult  Ct shared that she is ready for [place] and that the family is getting a puppy next week  Ct reports that work has calmed down and 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  Ct also got a raise  Ct reports that she and  are struggling with communication  Ct is feeling more anxious and may asked new prescriber for a med change  A- ct presented with moderate anxiety  Ct was verbal and engaged in session  Ct sleep is adequate  P- ct will attend session, take med's as instructed, and use techniques to manage moods and improve communication  Behavioral Health Treatment Plan [facility]: Diagnosis and Treatment Plan explained to Amber Mcdonough relates understanding diagnosis and is agreeable to Treatment Plan   Yes     Visit start and stop times:    12/13/22  Start Time: 1100  Stop Time: 1142  Total Visit Time: 42 minutes

## 2022-12-22 DIAGNOSIS — F41.9 ANXIETY: ICD-10-CM

## 2022-12-22 RX ORDER — BUSPIRONE HYDROCHLORIDE 10 MG/1
TABLET ORAL
Qty: 180 TABLET | Refills: 1 | Status: SHIPPED | OUTPATIENT
Start: 2022-12-22

## 2022-12-27 ENCOUNTER — SOCIAL WORK (OUTPATIENT)
Dept: BEHAVIORAL/MENTAL HEALTH CLINIC | Facility: CLINIC | Age: 52
End: 2022-12-27

## 2022-12-27 DIAGNOSIS — F31.30 BIPOLAR I DISORDER, MOST RECENT EPISODE DEPRESSED (HCC): Primary | ICD-10-CM

## 2022-12-27 NOTE — PSYCH
Psychotherapy Provided: Individual Psychotherapy 45 minutes     Length of time in session: 45 minutes, follow up in 2 week    Encounter Diagnosis     ICD-10-CM    1  Bipolar I disorder, most recent episode depressed ([facility] [address] )  F31 30           Goals addressed in session: Goal 1     Pain:      none    0    Current suicide risk : Low     D- ct shared that she was called into work the morning of Christmas for 4 hours  Ct went in at 2 and was done by 6  Ct came home and slept  Ct is adjusting to new puppy  Ct reports that  will be home more often in the New year  Ct  does not want family friend around as much and this upsets ct  Ct is frustrated with oldest daughter who stole a computer from the school  Ct feels that daughter is on the road of self destruction with poor decision making at home and at school  Ct is off this weekend and will be with family  A- ct presented with irritability and anxious mood  Ct was verbal and engaged in session  Ct sleep is inconsistent  P- ct will attend session, take med's as instructed, and use techniques to manage moods  Behavioral Health Treatment Plan [facility]: Diagnosis and Treatment Plan explained to Gladys Joaquin relates understanding diagnosis and is agreeable to Treatment Plan   Yes     Visit start and stop times:    12/27/22  Start Time: 1350  Stop Time: 1435  Total Visit Time: 45 minutes

## 2023-01-10 ENCOUNTER — SOCIAL WORK (OUTPATIENT)
Dept: BEHAVIORAL/MENTAL HEALTH CLINIC | Facility: CLINIC | Age: 53
End: 2023-01-10

## 2023-01-10 DIAGNOSIS — F31.30 BIPOLAR I DISORDER, MOST RECENT EPISODE DEPRESSED (HCC): Primary | ICD-10-CM

## 2023-01-10 NOTE — PSYCH
Psychotherapy Provided: Individual Psychotherapy 45 minutes     Length of time in session: 45 minutes, follow up in 2 week    Encounter Diagnosis     ICD-10-CM    1  Bipolar I disorder, most recent episode depressed ([facility] [address] )  F31 30           Goals addressed in session: Goal 1     Pain:      none    0    Current suicide risk : Low     D- ct shared that her  and family friend had a long talk and things seemed to be smoothed over for now  Ct sister has terminal cancer and will be moved to hospice soon  Ct went to see her recently  Ct sister in only 61 and ct brother also has cancer at 64  Ct is now concerned that she will get cancer  Ct shared that work is stressful because 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  Ct had an issue that feel between the cracks and she was abl;e to rectify it but ct was upset  One good thing is that ct son is playing an all start football game in [place] and ct and  are going  A- ct presented with moderate depressed mood  Ct is also anxious  Ct was verbal and engaged in session  Ct sleep is inconsistent  P- ct will attend session, take med's as instructed, and use techniques to manage moods  Behavioral Health Treatment Plan [facility]: Diagnosis and Treatment Plan explained to Madeleine Litten relates understanding diagnosis and is agreeable to Treatment Plan   Yes     Visit start and stop times:    01/10/23  Start Time: 1055  Stop Time: 1140  Total Visit Time: 45 minutes

## 2023-01-12 NOTE — PSYCH
This note was not shared with the patient due to reasonable likelihood of causing patient harm    PROGRESS NOTE        Edouard Lockwood      Name and Date of Birth:  Francesca Raines 45 y o  1970    Date of Visit: 23    SUBJECTIVE:      Cecy presents for medication management and follow-up  She reports that this week her sister passed away  She notes that the  is tomorrow  She notes a significant increase in anxiety over this week due to this  She has been utilizing her Xanax as needed  She shares that she stopped the BuSpar as it was not helpful for her  She has been utilizing the Xanax mostly in the evening to help initiate sleep as sleep has been difficult  She also mentions that her son has an All-Star game later this week and she is traveling to [place] for this  She reports that he is still not gotten his physical done that he needs to participate in the game and this has been stressful for her  She shares that while she still would like to explore switching her medications for mood due to side effects she does not feel this is the right time to make a change  We reviewed that we could explore this at a future appointment  She denies suicidal ideation, intent or plan at present, has no suicidal ideation, intent or plan at present  She denies any auditory hallucinations and visual hallucinations, denies any other delusional thinking, denies any delusional thinking  Cecy reports she has been having ongoing photosensitivity since starting Lamictal   She also feels that Abilify has increased her appetite and therefore caused weight gain  Miguel Case   HPI ROS Appetite Changes and Sleep: normal appetite, decreased sleep    Review Of Systems:      Constitutional Negative   ENT Negative   Cardiovascular Negative   Respiratory Negative   Gastrointestinal Negative   Genitourinary Negative   Musculoskeletal Negative   Integumentary Negative Neurological Negative   Endocrine Negative   Other Symptoms Negative and None       Laboratory Results: No results found for this or any previous visit  Substance Abuse History:    Social History     Substance and Sexual Activity   Drug Use Not on file       Family Psychiatric History:     No family history on file      The following portions of the patient's history were reviewed and updated as appropriate: past family history, past medical history, past social history, past surgical history and problem list     Social History     Socioeconomic History   • Marital status: /Civil Union     Spouse name: Not on file   • Number of children: Not on file   • Years of education: Not on file   • Highest education level: Not on file   Occupational History   • Not on file   Tobacco Use   • Smoking status: Former   • Smokeless tobacco: Never   Substance and Sexual Activity   • Alcohol use: Not on file   • Drug use: Not on file   • Sexual activity: Not on file   Other Topics Concern   • Not on file   Social History Narrative   • Not on file     Social Determinants of Health     Financial Resource Strain: Not on file   Food Insecurity: Not on file   Transportation Needs: Not on file   Physical Activity: Not on file   Stress: Not on file   Social Connections: Not on file   Intimate Partner Violence: Not on file   Housing Stability: Not on file     Social History     Social History Narrative   • Not on file        Social History     Tobacco History     Smoking Status  Former    Smokeless Tobacco Use  Never          Alcohol History     Alcohol Use Status  Not Asked          Drug Use     Drug Use Status  Not Asked          Sexual Activity     Sexually Active  Not Asked          Activities of Daily Living    Not Asked                     OBJECTIVE:     Mental Status Evaluation:    Appearance age appropriate, casually dressed   Behavior tearful   Speech normal volume, normal pitch   Mood Depressed, irritable at times Affect Mood congruent    Thought Processes logical   Associations intact associations   Thought Content normal   Perceptual Disturbances: none   Abnormal Thoughts  Risk Potential Suicidal ideation - None  Homicidal ideation - None  Potential for aggression - No   Orientation oriented to person, place, time/date and situation   Memory recent and remote memory grossly intact   Cosciousness alert and awake   Attention Span attention span and concentration are age appropriate   Intellect Appears to be of Average Intelligence   Insight age appropriate    Judgement good    Muscle Strength and  Gait muscle strength and tone were normal   Language no difficulty naming common objects   Fund of Knowledge displays adequate knowledge of current events   Pain none   Pain Scale 0       Assessment/Plan:       Diagnoses and all orders for this visit:    Bipolar I disorder, current episode depressed (HCC)    GUERDA (generalized anxiety disorder)          Treatment Recommendations/Precautions:    Patient's sister passed away this week and she would like to hold off on planned medication changes due to this  We will discuss taper and discontinuation of Lamictal and Abilify at the next appointment  We will also discuss other medications for mood  Continue current medications:    Abilify 15 mg QD for mood  Lamictal 50 mg QHS for mood   Xanax 0 25 (one half to one tablet ) BID PRN anxiety       We will follow up in one month  She is aware to call the office if any questions or concerns arise sooner  She is aware of emergent vs non-emergent mental health resources  She is able to contract for safety at this time  She will continue with her psychotherapist Kd Brown within this office  Risks/Benefits      Risks, Benefits And Possible Side Effects Of Medications:    Risks, benefits, and possible side effects of medications explained to patient and patient verbalizes understanding and agreement for treatment      Controlled Medication Discussion:     PDMP reviewed - no red flags   The patient understands the risk of treatment with this class of medication (xanax)  They are aware of the potential for abuse  Patient agrees not to drive or operate heavy machinery if feeling impaired, to take medication as prescribed, to not drink alcohol when taking this medication, and to not share this medication with others       Psychotherapy Provided:     Individual psychotherapy provided: No

## 2023-01-16 ENCOUNTER — OFFICE VISIT (OUTPATIENT)
Dept: PSYCHIATRY | Facility: CLINIC | Age: 53
End: 2023-01-16

## 2023-01-16 DIAGNOSIS — F41.9 ANXIETY: ICD-10-CM

## 2023-01-16 DIAGNOSIS — F31.9 BIPOLAR I DISORDER, CURRENT EPISODE DEPRESSED (HCC): Primary | ICD-10-CM

## 2023-01-16 DIAGNOSIS — F41.1 GAD (GENERALIZED ANXIETY DISORDER): ICD-10-CM

## 2023-01-16 RX ORDER — LAMOTRIGINE 25 MG/1
50 TABLET ORAL
Qty: 180 TABLET | Refills: 1 | Status: SHIPPED | OUTPATIENT
Start: 2023-01-16

## 2023-01-16 RX ORDER — ARIPIPRAZOLE 15 MG/1
15 TABLET ORAL DAILY
Qty: 90 TABLET | Refills: 0 | Status: SHIPPED | OUTPATIENT
Start: 2023-01-16

## 2023-01-16 RX ORDER — ALPRAZOLAM 0.25 MG/1
.125-.25 TABLET ORAL 2 TIMES DAILY PRN
Qty: 60 TABLET | Refills: 0 | Status: SHIPPED | OUTPATIENT
Start: 2023-01-16 | End: 2023-02-15

## 2023-01-24 ENCOUNTER — SOCIAL WORK (OUTPATIENT)
Dept: BEHAVIORAL/MENTAL HEALTH CLINIC | Facility: CLINIC | Age: 53
End: 2023-01-24

## 2023-01-24 DIAGNOSIS — F31.30 BIPOLAR I DISORDER, MOST RECENT EPISODE DEPRESSED (HCC): Primary | ICD-10-CM

## 2023-01-24 NOTE — PSYCH
Behavioral Health Psychotherapy Progress Note    Psychotherapy Provided: Individual Psychotherapy     1  Bipolar I disorder, most recent episode depressed ([address] )            Goals addressed in session: Goal 1DATA: ct shared that her sister passed away shortly after ct last session  Ct shared that they had the services and that she was tearful but managed herself  Ct feels like she needs to be there for her mother  Ct will check in more with niece  Ct went with  and son to [place] for football showcase  Ct shared that they had a good time as a couple  Ct daughter has a semiformal this week  During this session, this clinician used the following therapeutic modalities: Cognitive Behavioral Therapy and Cognitive Processing Therapy    Substance Abuse was not addressed during this session  If the client is diagnosed with a co-occurring substance use disorder, please indicate any changes in the frequency or amount of use: na  Stage of change for addressing substance use diagnoses: No substance use/Not applicable    ASSESSMENT:  Woodrow Garrett presents with a Euthymic/ normal and Anxious mood  her affect is Normal range and intensity, which is congruent, with her mood and the content of the session  The client has made progress on their goals  No SI/HI Jade Lori Tao presents with a none risk of suicide, none risk of self-harm, and none risk of harm to others  For any risk assessment that surpasses a "low" rating, a safety plan must be developed  A safety plan was indicated: no  If yes, describe in detail na    PLAN: Between sessions, Woodrow Garrett will grieve her loss  At the next session, the therapist will use Cognitive Behavioral Therapy and Cognitive Processing Therapy to address grief  Behavioral Health Treatment Plan and Discharge Planning: Woodrow Garrett is aware of and agrees to continue to work on their treatment plan  They have identified and are working toward their discharge goals  yes    Visit start and stop times:    01/24/23  Start Time: 1058  Stop Time: 1142  Total Visit Time: 44 minutes

## 2023-02-07 ENCOUNTER — SOCIAL WORK (OUTPATIENT)
Dept: BEHAVIORAL/MENTAL HEALTH CLINIC | Facility: CLINIC | Age: 53
End: 2023-02-07

## 2023-02-07 DIAGNOSIS — F31.30 BIPOLAR I DISORDER, MOST RECENT EPISODE DEPRESSED (HCC): Primary | ICD-10-CM

## 2023-02-07 NOTE — PSYCH
Behavioral Health Psychotherapy Progress Note    Psychotherapy Provided: Individual Psychotherapy     1  Bipolar I disorder, most recent episode depressed ([address] )            Goals addressed in session: Goal 1     DATA: ct shared that her friend maybe using because something feel out of his pocket and he quickly scooped it up and made a bad excuse as to what it was  Ct will confront him again soon  Ct is grieving the loss of her sister as is her mother  Ct reports that work is stressful  Ct is not satisfied with communication with   During this session, this clinician used the following therapeutic modalities: Cognitive Behavioral Therapy and Cognitive Processing Therapy    Substance Abuse was not addressed during this session  If the client is diagnosed with a co-occurring substance use disorder, please indicate any changes in the frequency or amount of use: na  Stage of change for addressing substance use diagnoses: No substance use/Not applicable    ASSESSMENT:  Jose Hernandez presents with a Euthymic/ normal and Anxious mood  Ct was verbal in session and engaged  Ct sleep is inconsistent  her affect is Normal range and intensity, which is congruent, with her mood and the content of the session  The client has made progress on their goals  No SI/HI Willie Tao presents with a none risk of suicide, none risk of self-harm, and none risk of harm to others  For any risk assessment that surpasses a "low" rating, a safety plan must be developed  A safety plan was indicated: no  If yes, describe in detail na    PLAN: Between sessions, Jose Hernandez will talk with friend and express concerns to     At the next session, the therapist will use Cognitive Behavioral Therapy and Cognitive Processing Therapy to address mood management  Behavioral Health Treatment Plan and Discharge Planning: Jose Hernandez is aware of and agrees to continue to work on their treatment plan   They have identified and are working toward their discharge goals   yes    Visit start and stop times:    02/07/23  Start Time: 1100  Stop Time: 1150  Total Visit Time: 50 minutes

## 2023-02-07 NOTE — BH TREATMENT PLAN
Outpatient Behavioral Health Psychotherapy Treatment Plan    Cecy Tao  1970     Date of Initial Psychotherapy Assessment: 9/2/2021   Date of Current Treatment Plan: 02/07/23  Treatment Plan Target Date: 7/7/2023  Treatment Plan Expiration Date: 8/7/2023    Diagnosis:   1  Bipolar I disorder, most recent episode depressed ([facility] [address] )            Area(s) of Need: mood management    Long Term Goal 1 (in the client's own words): ct would like to manage moods and cope with grief    Stage of Change: Preparation    Target Date for completion: 8/7/2023     Anticipated therapeutic modalities: CBT     People identified to complete this goal: client      Objective 1: (identify the means of measuring success in meeting the objective): ct will attend sessions and work through grief      Objective 2: (identify the means of measuring success in meeting the objective): ct will identify when moods are uncomfortable and use 2 coping skills to manage them      I am currently under the care of a [facility] psychiatric provider: yes    My [facility]'s psychiatric provider is: Ivania Vick    I am currently taking psychiatric medications: Yes, as prescribed    I feel that I will be ready for discharge from mental health care when I reach the following (measurable goal/objective): When I am comfortable managing my moods    For children and adults who have a legal guardian:   Has there been any change to custody orders and/or guardianship status? No  If yes, attach updated documentation  I have not created my Crisis Plan and have been offered a copy of this plan    [address]: Diagnosis and Treatment Plan explained to [address] acknowledges an understanding of their diagnosis  Yashira Philip agrees to this treatment plan      I have been offered a copy of this Treatment Plan  yes

## 2023-02-14 NOTE — PSYCH
This note was not shared with the patient due to reasonable likelihood of causing patient harm    PROGRESS NOTE        Edouard Lockwood      Name and Date of Birth:  Asael Chacon 45 y o  1970    Date of Visit: 02/14/23    SUBJECTIVE:    Justin Paul presents today for medication management and follow-up  At the last encounter she had shared that her sister passed away  At that time she wanted to hold off on planned medication changes due to the increased stress level  Today she appears much brighter compared to the last encounter  She reports that her moods have been "up-and-down" as she is still struggling with grief  She does report that she went on the trip to [place] for her son's [employer] game  She states "we had a really great time "    She denies any significant depressive symptoms  She denies any symptoms of dale  She has been taking her sparingly and mostly in the evening for sleep  She is still experiencing photosensitivity with Lamictal   She is on a very low dose and we discussed that it may not be doing much for mood at the current dosing  However, we will still slowly taper and cut the dose down to 25 mg for 15 days before stopping it completely  She was instructed to reach out if she experiences any significant changes in her baseline mood  She shares that she has just restarted the weight watchers program and she has lost 11 pounds in the last couple of weeks  She is focusing on adequate nutrition  She denies suicidal ideation, intent or plan at present, has no suicidal ideation, intent or plan at present  She denies any auditory hallucinations and visual hallucinations, denies any other delusional thinking, denies any delusional thinking  Whitney Calljanene   HPI ROS Appetite Changes and Sleep: normal appetite, normal sleep    Review Of Systems:      Constitutional Negative   ENT Negative   Cardiovascular Negative   Respiratory Negative Gastrointestinal Negative   Genitourinary Negative   Musculoskeletal Negative   Integumentary Negative   Neurological Negative   Endocrine Negative   Other Symptoms Negative and None       Laboratory Results: No results found for this or any previous visit  Substance Abuse History:    Social History     Substance and Sexual Activity   Drug Use Not on file       Family Psychiatric History:     No family history on file      The following portions of the patient's history were reviewed and updated as appropriate: past family history, past medical history, past social history, past surgical history and problem list     Social History     Socioeconomic History   • Marital status: /Civil Union     Spouse name: Not on file   • Number of children: Not on file   • Years of education: Not on file   • Highest education level: Not on file   Occupational History   • Not on file   Tobacco Use   • Smoking status: Former   • Smokeless tobacco: Never   Substance and Sexual Activity   • Alcohol use: Not on file   • Drug use: Not on file   • Sexual activity: Not on file   Other Topics Concern   • Not on file   Social History Narrative   • Not on file     Social Determinants of Health     Financial Resource Strain: Not on file   Food Insecurity: Not on file   Transportation Needs: Not on file   Physical Activity: Not on file   Stress: Not on file   Social Connections: Not on file   Intimate Partner Violence: Not on file   Housing Stability: Not on file     Social History     Social History Narrative   • Not on file        Social History     Tobacco History     Smoking Status  Former    Smokeless Tobacco Use  Never          Alcohol History     Alcohol Use Status  Not Asked          Drug Use     Drug Use Status  Not Asked          Sexual Activity     Sexually Active  Not Asked          Activities of Daily Living    Not Asked                     OBJECTIVE:     Mental Status Evaluation:    Appearance age appropriate, casually dressed   Behavior pleasant, cooperative, talkative    Speech normal volume, normal pitch   Mood euthymic   Affect Mood congruent    Thought Processes logical   Associations intact associations   Thought Content normal   Perceptual Disturbances: none   Abnormal Thoughts  Risk Potential Suicidal ideation - None  Homicidal ideation - None  Potential for aggression - No   Orientation oriented to person, place, time/date and situation   Memory recent and remote memory grossly intact   Cosciousness alert and awake   Attention Span attention span and concentration are age appropriate   Intellect Appears to be of Average Intelligence   Insight age appropriate    Judgement good    Muscle Strength and  Gait muscle strength and tone were normal   Language no difficulty naming common objects   Fund of Knowledge displays adequate knowledge of current events   Pain none   Pain Scale 0       Assessment/Plan:       Diagnoses and all orders for this visit:    Bipolar I disorder, current episode depressed (HCC)    GUERDA (generalized anxiety disorder)          Treatment Recommendations/Precautions:    Patient would like to move forward with planned medication changes  We held off at last appointment due to her sisters passing  Will decrease Lamictal to 25 mg QD for 15 days and then patient will discontinue for poor efficacy  At the next encounter we will taper Abilify and likely start [address] for mood  Continue current medications:    Abilify 15 mg QD for mood    Xanax 0 25 (one half to one tablet ) BID PRN anxiety         We will follow up in one month  She is aware to call the office if any questions or concerns arise sooner  She is aware of emergent vs non-emergent mental health resources  She is able to contract for safety at this time  She will continue with her psychotherapist Usha Bender within this office      Risks/Benefits      Risks, Benefits And Possible Side Effects Of Medications:    Risks, benefits, and possible side effects of medications explained to patient and patient verbalizes understanding and agreement for treatment  Controlled Medication Discussion:     PDMP reviewed - no red flags- refill not requested at this time   The patient understands the risk of treatment with this class of medication (xanax)  They are aware of the potential for abuse  Patient agrees not to drive or operate heavy machinery if feeling impaired, to take medication as prescribed, to not drink alcohol when taking this medication, and to not share this medication with others       Psychotherapy Provided:     Individual psychotherapy provided: No

## 2023-02-16 ENCOUNTER — OFFICE VISIT (OUTPATIENT)
Dept: PSYCHIATRY | Facility: CLINIC | Age: 53
End: 2023-02-16

## 2023-02-16 DIAGNOSIS — F31.9 BIPOLAR I DISORDER, CURRENT EPISODE DEPRESSED (HCC): Primary | ICD-10-CM

## 2023-02-16 DIAGNOSIS — F41.1 GAD (GENERALIZED ANXIETY DISORDER): ICD-10-CM

## 2023-02-16 RX ORDER — LAMOTRIGINE 25 MG/1
25 TABLET ORAL DAILY
COMMUNITY

## 2023-02-21 ENCOUNTER — SOCIAL WORK (OUTPATIENT)
Dept: BEHAVIORAL/MENTAL HEALTH CLINIC | Facility: CLINIC | Age: 53
End: 2023-02-21

## 2023-02-21 DIAGNOSIS — F31.30 BIPOLAR I DISORDER, MOST RECENT EPISODE DEPRESSED (HCC): Primary | ICD-10-CM

## 2023-02-21 NOTE — PSYCH
Behavioral Health Psychotherapy Progress Note    Psychotherapy Provided: Individual Psychotherapy     1  Bipolar I disorder, most recent episode depressed ([address] )            Goals addressed in session: Goal 1     DATA: ct shared that she discovered syringes that belonged to the family friend  Ct was very anxious and realizes that he is using heroin again  Ct will confront him after session  Ct was disappointed in him and feels like she cannot do anything more to help him  We discussed that this friend also needs to be responsible and has refused to do more in his recovery in the past and that it always leads back to relapse  Ct reports that otherwise things were going alright prior to this mornings discovery  During this session, this clinician used the following therapeutic modalities: Cognitive Behavioral Therapy and Cognitive Processing Therapy    Substance Abuse was not addressed during this session  If the client is diagnosed with a co-occurring substance use disorder, please indicate any changes in the frequency or amount of use: na  Stage of change for addressing substance use diagnoses: No substance use/Not applicable    ASSESSMENT:  Yajaira Baer presents with a Anxious and Depressed mood  her affect is Flat and Tearful, which is congruent, with her mood and the content of the session  The client has made progress on their goals  No SI/HI Chevy Inna Dinh presents with a none risk of suicide, none risk of self-harm, and none risk of harm to others  For any risk assessment that surpasses a "low" rating, a safety plan must be developed  A safety plan was indicated: no  If yes, describe in detail na    PLAN: Between sessions, Yajaira Baer will talk with friend and manage moods  At the next session, the therapist will use Cognitive Behavioral Therapy and Cognitive Processing Therapy to address bipolar disorder      Behavioral Health Treatment Plan and Discharge Planning: Yajaira Baer is aware of and agrees to continue to work on their treatment plan  They have identified and are working toward their discharge goals   yes    Visit start and stop times:    02/21/23  Start Time: 1052  Stop Time: 1132  Total Visit Time: 40 minutes

## 2023-03-07 ENCOUNTER — SOCIAL WORK (OUTPATIENT)
Dept: BEHAVIORAL/MENTAL HEALTH CLINIC | Facility: CLINIC | Age: 53
End: 2023-03-07

## 2023-03-07 ENCOUNTER — TELEPHONE (OUTPATIENT)
Dept: PSYCHIATRY | Facility: CLINIC | Age: 53
End: 2023-03-07

## 2023-03-07 DIAGNOSIS — F31.30 BIPOLAR I DISORDER, MOST RECENT EPISODE DEPRESSED (HCC): Primary | ICD-10-CM

## 2023-03-07 NOTE — PSYCH
Behavioral Health Psychotherapy Progress Note    Psychotherapy Provided: Individual Psychotherapy     1  Bipolar I disorder, most recent episode depressed ([address] )            Goals addressed in session: Goal 1     DATA: ct shared that she confronted family friend about his heroin use  Ct friend did admit it to using but not daily  Ct believes him  Ct is encouraged to ask friend to do more for his recovery  Ct is codependency and gives him money and is easy to forgive  Ct stopped all medications when she was sick 2 weeks ago  Ct will see medication provider soon for medication  Ct reports mood is irritable especially with daughter and at work  Ct son was honored as a student athlete at [employer] recently  Ct son will be moving home in 2 months and he has a job  During this session, this clinician used the following therapeutic modalities: Cognitive Behavioral Therapy and Cognitive Processing Therapy    Substance Abuse was not addressed during this session  If the client is diagnosed with a co-occurring substance use disorder, please indicate any changes in the frequency or amount of use: na  Stage of change for addressing substance use diagnoses: No substance use/Not applicable    ASSESSMENT:  Anthony King presents with a Anxious and Depressed mood  her affect is Flat, which is congruent, with her mood and the content of the session  The client has made progress on their goals  NO SI?HI Cecy Tao presents with a none risk of suicide, none risk of self-harm, and none risk of harm to others  For any risk assessment that surpasses a "low" rating, a safety plan must be developed  A safety plan was indicated: no  If yes, describe in detail na    PLAN: Between sessions, Anthony King will manage moods/boundaries  At the next session, the therapist will use Cognitive Behavioral Therapy and Cognitive Processing Therapy to address mood disorder/codependency      Behavioral Health Treatment Plan and Discharge Planning: Glenis Harrison is aware of and agrees to continue to work on their treatment plan  They have identified and are working toward their discharge goals   yes    Visit start and stop times:    03/07/23  Start Time: 1100  Stop Time: 1140  Total Visit Time: 40 minutes

## 2023-03-07 NOTE — TELEPHONE ENCOUNTER
Per request of Selina Cox [id number], called patient to try get an earlier appointment to be scheduled than 3/16/2023 appointment with Marcia Monterroso PA-C  Patient states she is working and is unable to come in any earlier than the 16th appointment

## 2023-03-14 NOTE — PSYCH
This note was not shared with the patient due to reasonable likelihood of causing patient harm    PROGRESS NOTE        Edouard Lockwood      Name and Date of Birth:  Lukasz De Leon 45 y o  1970    Date of Visit: 03/16/23    SUBJECTIVE:    Jenny Bates presents today for medication management and follow-up  She shares that she has discontinued her Abilify and Lamictal after being diagnosed with COVID and having viral-like symptoms approximately 3 weeks ago  She states "I really was not feeling well so I was not able to take them "  She denies any significant mood changes  She denies any depressive, manic, or anxiety symptoms  She does note some intermittent irritability  We discussed that by his long-acting and has a long half-life it is possible that with her dosing it may still be effective for her mood  She feels physically well today  She shares that her daughter's father that currently lives in the home has been affecting her anxiety level  He recently told her that there may be a warrant out for his arrest due to him not paying his fee for drug court  Despite this high stress level she reports that she does not take her Xanax often  She uses it sparingly  She has been sleeping well and has a good appetite  She has recently joined [employer] and has already lost 17 pounds  She denies suicidal ideation, intent or plan at present, has no suicidal ideation, intent or plan at present  She denies any auditory hallucinations and visual hallucinations, denies any other delusional thinking, denies any delusional thinking  She denies any side effects from medications      HPI ROS Appetite Changes and Sleep: normal appetite, normal sleep    Review Of Systems:      Constitutional Negative   ENT Negative   Cardiovascular Negative   Respiratory Negative   Gastrointestinal Negative   Genitourinary Negative   Musculoskeletal Negative   Integumentary Negative   Neurological Negative   Endocrine Negative   Other Symptoms Negative and None       Laboratory Results: No results found for this or any previous visit  Substance Abuse History:    Social History     Substance and Sexual Activity   Drug Use Not on file       Family Psychiatric History:     No family history on file      The following portions of the patient's history were reviewed and updated as appropriate: past family history, past medical history, past social history, past surgical history and problem list     Social History     Socioeconomic History   • Marital status: /Civil Union     Spouse name: Not on file   • Number of children: Not on file   • Years of education: Not on file   • Highest education level: Not on file   Occupational History   • Not on file   Tobacco Use   • Smoking status: Former   • Smokeless tobacco: Never   Substance and Sexual Activity   • Alcohol use: Not on file   • Drug use: Not on file   • Sexual activity: Not on file   Other Topics Concern   • Not on file   Social History Narrative   • Not on file     Social Determinants of Health     Financial Resource Strain: Not on file   Food Insecurity: Not on file   Transportation Needs: Not on file   Physical Activity: Not on file   Stress: Not on file   Social Connections: Not on file   Intimate Partner Violence: Not on file   Housing Stability: Not on file     Social History     Social History Narrative   • Not on file        Social History     Tobacco History     Smoking Status  Former    Smokeless Tobacco Use  Never          Alcohol History     Alcohol Use Status  Not Asked          Drug Use     Drug Use Status  Not Asked          Sexual Activity     Sexually Active  Not Asked          Activities of Daily Living    Not Asked                     OBJECTIVE:     Mental Status Evaluation:    Appearance age appropriate, casually dressed   Behavior pleasant, cooperative   Speech normal volume, normal pitch   Mood  euthymic, slightly anxious   Affect  mood congruent   Thought Processes logical   Associations intact associations   Thought Content normal   Perceptual Disturbances: none   Abnormal Thoughts  Risk Potential Suicidal ideation - None  Homicidal ideation - None  Potential for aggression - No   Orientation oriented to person, place, time/date and situation   Memory recent and remote memory grossly intact   Cosciousness alert and awake   Attention Span attention span and concentration are age appropriate   Intellect Appears to be of Average Intelligence   Insight age appropriate    Judgement good    Muscle Strength and  Gait muscle strength and tone were normal   Language no difficulty naming common objects   Fund of Knowledge displays adequate knowledge of current events   Pain none   Pain Scale 0       Assessment/Plan:       Diagnoses and all orders for this visit:    Bipolar 1 disorder, depressed, partial remission (HCC)  -     lurasidone (LATUDA) 20 mg tablet; Take 1 tablet (20 mg total) by mouth daily with dinner    GUERDA (generalized anxiety disorder)    Anxiety  -     ALPRAZolam (XANAX) 0 25 mg tablet; Take 0 5-1 tablets (0 125-0 25 mg total) by mouth 2 (two) times a day as needed for anxiety or sleep          Treatment Recommendations/Precautions:    Patient has discontinued her medications after skipping them when she was sick with COVID  We discussed that in the future a taper and consult with a medical provider is the safest way to discontinue a medication  Discussed indication, potential side effects, and benefits of Latuda for mood  Will start Latuda 20 mg daily with dinner for mood    Continue the following medication:  Xanax 0 25 mg as needed take 1/2-1 twice a day for anxiety    We will follow-up in 1 month or sooner if questions or concerns arise  She is aware of emergent versus nonemergent mental health resources  She is able to contract for safety at this time    She will continue with her psychotherapist within this office    Risks/Benefits      Risks, Benefits And Possible Side Effects Of Medications:    Risks, benefits, and possible side effects of medications explained to patient and patient verbalizes understanding and agreement for treatment  Controlled Medication Discussion:     PDMP reviewed-no red flags  The patient understands the risk of treatment with this class of medication (xanax)  They are aware of the potential for abuse  Patient agrees not to drive or operate heavy machinery if feeling impaired, to take medication as prescribed, to not drink alcohol when taking this medication, and to not share this medication with others       Psychotherapy Provided:     Individual psychotherapy provided: No

## 2023-03-16 ENCOUNTER — OFFICE VISIT (OUTPATIENT)
Dept: PSYCHIATRY | Facility: CLINIC | Age: 53
End: 2023-03-16

## 2023-03-16 DIAGNOSIS — F41.9 ANXIETY: ICD-10-CM

## 2023-03-16 DIAGNOSIS — F31.75 BIPOLAR 1 DISORDER, DEPRESSED, PARTIAL REMISSION (HCC): Primary | ICD-10-CM

## 2023-03-16 DIAGNOSIS — F41.1 GAD (GENERALIZED ANXIETY DISORDER): ICD-10-CM

## 2023-03-16 RX ORDER — ALPRAZOLAM 0.25 MG/1
.125-.25 TABLET ORAL 2 TIMES DAILY PRN
Qty: 60 TABLET | Refills: 0 | Status: SHIPPED | OUTPATIENT
Start: 2023-03-16 | End: 2023-04-15

## 2023-03-16 RX ORDER — LURASIDONE HYDROCHLORIDE 20 MG/1
20 TABLET, FILM COATED ORAL
Qty: 30 TABLET | Refills: 1 | Status: SHIPPED | OUTPATIENT
Start: 2023-03-16 | End: 2023-05-15

## 2023-03-17 ENCOUNTER — TELEPHONE (OUTPATIENT)
Dept: PSYCHIATRY | Facility: CLINIC | Age: 53
End: 2023-03-17

## 2023-03-17 NOTE — TELEPHONE ENCOUNTER
Pt called and she says the Latuda is very expensive , the cost is $312.00 with insurance, and she cannot afford that, please advice.

## 2023-03-21 ENCOUNTER — SOCIAL WORK (OUTPATIENT)
Dept: BEHAVIORAL/MENTAL HEALTH CLINIC | Facility: CLINIC | Age: 53
End: 2023-03-21

## 2023-03-21 DIAGNOSIS — F31.30 BIPOLAR I DISORDER, MOST RECENT EPISODE DEPRESSED (HCC): Primary | ICD-10-CM

## 2023-03-21 NOTE — PSYCH
Behavioral Health Psychotherapy Progress Note    Psychotherapy Provided: Individual Psychotherapy     1  Bipolar I disorder, most recent episode depressed ([address] )            Goals addressed in session: Goal 1     DATA: ct shared that she is angry at  because he is putting restrictions on how often family friend can spend at the house  Ct  is doing this because there older son is moving back in 6 weeks from now  Ct has feeling for this family friend but the relationship is plutonic  Ct is anxious about work because staff is leaving and there is no one to fill in  Ct was harassed by director because ct missed 2 days of work due to illness  Ct was threatened with a write up  Ct will hopefully get new medicine today after insurance error  During this session, this clinician used the following therapeutic modalities: Cognitive Behavioral Therapy and Cognitive Processing Therapy    Substance Abuse was not addressed during this session  If the client is diagnosed with a co-occurring substance use disorder, please indicate any changes in the frequency or amount of use: na  Stage of change for addressing substance use diagnoses: No substance use/Not applicable    ASSESSMENT:  Yajaira Baer presents with a Anxious and Depressed mood  her affect is Normal range and intensity, which is congruent, with her mood and the content of the session  The client has made progress on their goals  No SI/HI Chevy Keith Capsheree presents with a none risk of suicide, none risk of self-harm, and none risk of harm to others  For any risk assessment that surpasses a "low" rating, a safety plan must be developed  A safety plan was indicated: no  If yes, describe in detail na    PLAN: Between sessions, Yajaira Baer will manage moods  At the next session, the therapist will use Cognitive Behavioral Therapy and Cognitive Processing Therapy to address mood instability      Behavioral Health Treatment Plan and Discharge Planning: Erendira Hager is aware of and agrees to continue to work on their treatment plan  They have identified and are working toward their discharge goals   yes    Visit start and stop times:    03/21/23  Start Time: 1102  Stop Time: 1153  Total Visit Time: 51 minutes

## 2023-04-04 ENCOUNTER — SOCIAL WORK (OUTPATIENT)
Dept: BEHAVIORAL/MENTAL HEALTH CLINIC | Facility: CLINIC | Age: 53
End: 2023-04-04

## 2023-04-04 DIAGNOSIS — F31.30 BIPOLAR I DISORDER, MOST RECENT EPISODE DEPRESSED (HCC): Primary | ICD-10-CM

## 2023-04-04 NOTE — PSYCH
"Behavioral Health Psychotherapy Progress Note    Psychotherapy Provided: Individual Psychotherapy     1  Bipolar I disorder, most recent episode depressed ([address] )            Goals addressed in session: Goal 1     DATA: ct shared that she and  had some long discussions  Ct asked  for a separation and he refused  They talked some more and they will work on the marriage  Family friend who ct has feelings for will only come by on th weekends  Ct  has noticed that ct spends too much time with this person  Ct also needs to set boundaries with this friend because he relies ion her too much for money/rides/support  During this session, this clinician used the following therapeutic modalities: Cognitive Behavioral Therapy and Cognitive Processing Therapy    Substance Abuse was not addressed during this session  If the client is diagnosed with a co-occurring substance use disorder, please indicate any changes in the frequency or amount of use: na  Stage of change for addressing substance use diagnoses: na    ASSESSMENT:  Cecy Tao presents with a Anxious mood  her affect is Normal range and intensity, which is congruent, with her mood and the content of the session  The client has made progress on their goals  No SI/HI Evaristo León Tao presents with a none risk of suicide, none risk of self-harm, and none risk of harm to others  For any risk assessment that surpasses a \"low\" rating, a safety plan must be developed  A safety plan was indicated: no  If yes, describe in detail na    PLAN: Between sessions, Kristina Bryant will manage boundaries/communicate with   At the next session, the therapist will use Cognitive Behavioral Therapy and Cognitive Processing Therapy to address family issues  Behavioral Health Treatment Plan and Discharge Planning: Kristina Bryant is aware of and agrees to continue to work on their treatment plan   They have identified and are working toward their " discharge goals   yes    Visit start and stop times:    04/04/23  Start Time: 1100  Stop Time: 1150  Total Visit Time: 50 minutes

## 2023-05-02 ENCOUNTER — SOCIAL WORK (OUTPATIENT)
Dept: BEHAVIORAL/MENTAL HEALTH CLINIC | Facility: CLINIC | Age: 53
End: 2023-05-02

## 2023-05-02 DIAGNOSIS — F31.30 BIPOLAR I DISORDER, MOST RECENT EPISODE DEPRESSED (HCC): Primary | ICD-10-CM

## 2023-05-02 NOTE — PSYCH
"Behavioral Health Psychotherapy Progress Note    Psychotherapy Provided: Individual Psychotherapy     1  Bipolar I disorder, most recent episode depressed ([address] )            Goals addressed in session: Goal 1     DATA: ct shared that she and family friend had an argument  Family friend was with a girl and would not answer ct  Ct felt justified to call him numerous times  Ct feels that she has a right to know what he is doing because of the time she has invested  We talked about how the relationship is inappropriate and enmeshed  Ct acknowledges that it is complicated but is unwilling to change  Ct feels that when her and  are together she focuses less on family friend  Ct and  will be together and on the same work schedule to next couple of weeks  Ct son is graduating from college this weekend  Ct is getting oldest daughter signed up with job training and a   During this session, this clinician used the following therapeutic modalities: Cognitive Behavioral Therapy and Cognitive Processing Therapy    Substance Abuse was not addressed during this session  If the client is diagnosed with a co-occurring substance use disorder, please indicate any changes in the frequency or amount of use: na  Stage of change for addressing substance use diagnoses: No substance use/Not applicable    ASSESSMENT:  Shaina Early presents with a Anxious mood  her affect is Normal range and intensity, which is congruent, with her mood and the content of the session  The client has made progress on their goals  No SI/HI Eugenio Lie Capitini presents with a none risk of suicide, none risk of self-harm, and none risk of harm to others  For any risk assessment that surpasses a \"low\" rating, a safety plan must be developed  A safety plan was indicated: no  If yes, describe in detail na    PLAN: Between sessions, Shaina Early will manage moods/relationship   At the next session, the therapist will use " Cognitive Behavioral Therapy and Cognitive Processing Therapy to address Bipolar disorder  Behavioral Health Treatment Plan and Discharge Planning: Dee Keyes is aware of and agrees to continue to work on their treatment plan  They have identified and are working toward their discharge goals   yes    Visit start and stop times:    05/02/23  Start Time: 1041  Stop Time: 1123  Total Visit Time: 42 minutes

## 2023-05-03 ENCOUNTER — TELEPHONE (OUTPATIENT)
Dept: PSYCHIATRY | Facility: CLINIC | Age: 53
End: 2023-05-03

## 2023-05-03 NOTE — TELEPHONE ENCOUNTER
Cecy called and is really having some issues on the medication Latuda  One minute she can not shut of and than the next she hates everyone  Is there anything you can do for her without seeing her?  Or can I try to set up a virtual     Please advise

## 2023-05-04 ENCOUNTER — OFFICE VISIT (OUTPATIENT)
Dept: PSYCHIATRY | Facility: CLINIC | Age: 53
End: 2023-05-04

## 2023-05-04 VITALS
BODY MASS INDEX: 43.87 KG/M2 | HEART RATE: 83 BPM | HEIGHT: 64 IN | SYSTOLIC BLOOD PRESSURE: 126 MMHG | WEIGHT: 257 LBS | DIASTOLIC BLOOD PRESSURE: 87 MMHG

## 2023-05-04 DIAGNOSIS — F31.75 BIPOLAR 1 DISORDER, DEPRESSED, PARTIAL REMISSION (HCC): ICD-10-CM

## 2023-05-04 DIAGNOSIS — Z63.0 MARITAL CONFLICT: ICD-10-CM

## 2023-05-04 DIAGNOSIS — F31.9 BIPOLAR 1 DISORDER (HCC): Primary | ICD-10-CM

## 2023-05-04 DIAGNOSIS — F31.9 BIPOLAR I DISORDER, CURRENT EPISODE DEPRESSED (HCC): ICD-10-CM

## 2023-05-04 SDOH — SOCIAL STABILITY - SOCIAL INSECURITY: PROBLEMS IN RELATIONSHIP WITH SPOUSE OR PARTNER: Z63.0

## 2023-05-04 NOTE — LETTER
May 4, 2023     Patient: Araceli Donald  YOB: 1970  Date of Visit: 5/4/2023      To Whom it May Concern:    Araceli Donald is under my professional care  Cecy was seen in my office on 5/4/2023  Please excuse her absence on 5/4/2023 and 5/5/2023 due to changes in treatment  If you have any questions or concerns, please don't hesitate to call           Sincerely,            Alfredo Barone, PhD, MPH, MSN APRN

## 2023-05-05 ENCOUNTER — TELEPHONE (OUTPATIENT)
Dept: PSYCHIATRY | Facility: CLINIC | Age: 53
End: 2023-05-05

## 2023-05-09 NOTE — PSYCH
"Visit Time    Visit Start Time: 3:30 PM  Visit Stop Time: 4:00 PM  Total Visit Duration: 30 minutes    Subjective:     Patient ID: Abby Smith is a 45 y o  female bipolar disorder and depression, anxiety seen for medication evaluation, not doing well on recently prescribed Latuda  Prescriber is on vacation and this is an evaluation session  Cecy reports since taking  Latuda her moods have been up and down  Sister  in , brother has multiple myeloma which has affected her mood as well  She has not noticed a change in her mood after the increase to 40 mg Latuda  She reports she is eating and sleeping; however, she is irritable and everything \"sets her off \"  Takes medications as prescribed  Xanax helps reduce her anxiety  Family are supportive  HPI ROS Appetite Changes and Sleep: normal appetite and increased energy    Review Of Systems:     Mood Anxiety, Depression and Emotional Lability   Behavior Normal    Thought Content Normal   General Relationship Problems and Emotional Problems   Personality Normal   Other Psych Symptoms Normal   Constitutional As Noted in HPI   ENT As Noted in HPI   Cardiovascular As Noted in HPI   Respiratory As Noted in HPI   Gastrointestinal As Noted in HPI   Genitourinary As Noted in HPI   Musculoskeletal As Noted in HPI   Integumentary As Noted in HPI   Neurological As Noted in HPI   Endocrine Hypothyroid   Other Symptoms Normal        Substance Abuse History:  Social History     Substance and Sexual Activity   Drug Use Not on file       Family Psychiatric History: History reviewed  No pertinent family history      The following portions of the patient's history were reviewed and updated as appropriate: allergies, current medications, past family history, past medical history, past social history, past surgical history and problem list     Social History     Socioeconomic History   • Marital status: /Civil Union     Spouse name: Not on file   • Number of " children: Not on file   • Years of education: Not on file   • Highest education level: Not on file   Occupational History   • Not on file   Tobacco Use   • Smoking status: Former   • Smokeless tobacco: Never   Substance and Sexual Activity   • Alcohol use: Not on file   • Drug use: Not on file   • Sexual activity: Not on file   Other Topics Concern   • Not on file   Social History Narrative   • Not on file     Social Determinants of Health     Financial Resource Strain: Not on file   Food Insecurity: Not on file   Transportation Needs: Not on file   Physical Activity: Not on file   Stress: Not on file   Social Connections: Not on file   Intimate Partner Violence: Not on file   Housing Stability: Not on file     Social History     Social History Narrative   • Not on file       Objective:       Mental status:  Appearance adequate hygiene and grooming, restless and fidgety and good eye contact    Mood irritable and anxious   Affect affect appropriate    Speech a normal rate   Thought Processes normal thought processes   Hallucinations no hallucinations present    Thought Content no delusions   Abnormal Thoughts no suicidal thoughts  and no homicidal thoughts    Orientation  oriented to person and place and time   Remote Memory short term memory intact and long term memory intact   Attention Span concentration intact   Intellect Appears to be Average Intelligence   Insight Insight intact   Judgement judgment was intact   Muscle Strength Muscle strength and tone were normal   Language no difficulty naming common objects   Fund of Knowledge displays adequate knowledge of current events   Pain none   Pain Scale 0       Assessment/Plan:       Diagnoses and all orders for this visit:    B[facility] 1 disorder ([facility] )  -     cariprazine (Vraylar) 1 5 MG capsule;  Take 1 capsule (1 5 mg total) by mouth daily            Treatment Recommendations- Risks Benefits      Immediate Medical/Psychiatric/Psychotherapy Treatments and Any Precautions:  reviewed medication continue with treatment plan  Latuda, start Vraylar 1 5 mg daily    Risks, Benefits And Possible Side Effects Of Medications: Vraylar {PSYCH RISK, BENEFITS AND POSSIBLE SIDE EFFECTS (Optional):15808    Controlled Medication Discussion: She is aware of safe use and storage of medication     Psychotherapy Provided: 30 minutes individual psychotherapy provided     Supportive therapy  Medication evaluation  Mood assessment  Medication education    Goals discussed in session: Improve mood stability    Treatment plan not due

## 2023-05-09 NOTE — PATIENT INSTRUCTIONS
Continue medications  Call with problems or concerns   Start Vraylar 1 5 mg and contact prescriber for a follow-up appointment

## 2023-05-16 ENCOUNTER — SOCIAL WORK (OUTPATIENT)
Dept: BEHAVIORAL/MENTAL HEALTH CLINIC | Facility: CLINIC | Age: 53
End: 2023-05-16

## 2023-05-16 DIAGNOSIS — F31.30 BIPOLAR I DISORDER, MOST RECENT EPISODE DEPRESSED (HCC): Primary | ICD-10-CM

## 2023-05-16 NOTE — PSYCH
"Behavioral Health Psychotherapy Progress Note    Psychotherapy Provided: Individual Psychotherapy     1  Bipolar I disorder, most recent episode depressed ([address] )            Goals addressed in session: Goal 1     DATA: ct shared that oldest daughter went to school and make accusations about father  ROMMEL got involved and it looks like the case will be closed  Ct daughter is lashing out because she is feeling abandoned by family because they want her in a group home when she turns 23  Ct will support daughter but feels that having her in the house when she is 23 will be too much  Ct daughter needs assistance to live independently and ct does not feel she can provide it  Ct family friend may have to temporarily move in because he may lose his housing  Ct son graduated college last week and ct is happy for him  During this session, this clinician used the following therapeutic modalities: Cognitive Behavioral Therapy and Cognitive Processing Therapy    Substance Abuse was not addressed during this session  If the client is diagnosed with a co-occurring substance use disorder, please indicate any changes in the frequency or amount of use: na  Stage of change for addressing substance use diagnoses: No substance use/Not applicable    ASSESSMENT:  Washington Jay presents with a Anxious mood  her affect is Normal range and intensity, which is congruent, with her mood and the content of the session  The client has made progress on their goals  No SI/HI Nicolette Tao presents with a none risk of suicide, none risk of self-harm, and none risk of harm to others  For any risk assessment that surpasses a \"low\" rating, a safety plan must be developed  A safety plan was indicated: no  If yes, describe in detail na    PLAN: Between sessions, Washington Jay will manage moods/communicate with daughter   At the next session, the therapist will use Cognitive Behavioral Therapy and Cognitive Processing Therapy to address " mood disorder  Behavioral Health Treatment Plan and Discharge Planning: Marlen Whipple is aware of and agrees to continue to work on their treatment plan  They have identified and are working toward their discharge goals   yes    Visit start and stop times:    05/16/23  Start Time: 1053  Stop Time: 1138  Total Visit Time: 45 minutes

## 2023-05-30 ENCOUNTER — SOCIAL WORK (OUTPATIENT)
Dept: BEHAVIORAL/MENTAL HEALTH CLINIC | Facility: CLINIC | Age: 53
End: 2023-05-30

## 2023-05-30 DIAGNOSIS — F31.30 BIPOLAR I DISORDER, MOST RECENT EPISODE DEPRESSED (HCC): Primary | ICD-10-CM

## 2023-05-30 NOTE — PSYCH
"Behavioral Health Psychotherapy Progress Note    Psychotherapy Provided: Individual Psychotherapy     1  Bipolar I disorder, most recent episode depressed ([address] )            Goals addressed in session: Goal 1     DATA: ct shared that DYDILAN is still involved but that she has not heard from them in 2 weeks  Ct went to a disabled children fair and got more information on how to help 12year old daughter when she turns 23  Ct 12year old daughter is not acting out as much and ct is talking to her more about the plans and that they are not kicking her out at 23  Ct family friend moved in temporarily  Ct reports that wok is stressful but she is handling better recently  During this session, this clinician used the following therapeutic modalities: Cognitive Behavioral Therapy    Substance Abuse was not addressed during this session  If the client is diagnosed with a co-occurring substance use disorder, please indicate any changes in the frequency or amount of use: na  Stage of change for addressing substance use diagnoses: No substance use/Not applicable    ASSESSMENT:  Marlen Peterson presents with a Anxious mood  her affect is Normal range and intensity, which is congruent, with her mood and the content of the session  The client has made progress on their goals  No SI/HI Thai Tao presents with a none risk of suicide, none risk of self-harm, and none risk of harm to others  For any risk assessment that surpasses a \"low\" rating, a safety plan must be developed  A safety plan was indicated: no  If yes, describe in detail na    PLAN: Between sessions, Marlen Peterson will manage anxiety/moods  At the next session, the therapist will use Cognitive Behavioral Therapy to address Moods  Behavioral Health Treatment Plan and Discharge Planning: Marlen Peterson is aware of and agrees to continue to work on their treatment plan  They have identified and are working toward their discharge goals   yes    Visit " start and stop times:    05/30/23  Start Time: 1055  Stop Time: 1138  Total Visit Time: 43 minutes

## 2023-06-13 ENCOUNTER — SOCIAL WORK (OUTPATIENT)
Dept: BEHAVIORAL/MENTAL HEALTH CLINIC | Facility: CLINIC | Age: 53
End: 2023-06-13
Payer: COMMERCIAL

## 2023-06-13 DIAGNOSIS — F31.30 BIPOLAR I DISORDER, MOST RECENT EPISODE DEPRESSED (HCC): Primary | ICD-10-CM

## 2023-06-13 PROCEDURE — 90834 PSYTX W PT 45 MINUTES: CPT

## 2023-06-13 NOTE — PSYCH
"Behavioral Health Psychotherapy Progress Note    Psychotherapy Provided: Individual Psychotherapy     1  Bipolar I disorder, most recent episode depressed ([address] )            Goals addressed in session: Goal 1     DATA: ct shared that things have been going smooth the past few weeks at home  Ct reports that the girls are out of school and family friend is living on the couch but looking for a place to live  Ct reports that her and  are getting along well  Ct reports usual issues with kids cleaning up after themselves  Ct has work stress and maybe written up when she goes back to work tomorrow because she missed completing something last week  Ct feels good on new medicine  During this session, this clinician used the following therapeutic modalities: Cognitive Behavioral Therapy    Substance Abuse was not addressed during this session  If the client is diagnosed with a co-occurring substance use disorder, please indicate any changes in the frequency or amount of use: na  Stage of change for addressing substance use diagnoses: No substance use/Not applicable    ASSESSMENT:  Mei Montoya presents with a Euthymic/ normal mood  her affect is Normal range and intensity, which is congruent, with her mood and the content of the session  The client has made progress on their goals  No SI/HI Meghan Alixoscar Dinh presents with a none risk of suicide, none risk of self-harm, and none risk of harm to others  For any risk assessment that surpasses a \"low\" rating, a safety plan must be developed  A safety plan was indicated: no  If yes, describe in detail na    PLAN: Between sessions, Mei Montoya will manage moods  At the next session, the therapist will use Cognitive Behavioral Therapy to address Bipolar Disorder  Behavioral Health Treatment Plan and Discharge Planning: Mie Montoya is aware of and agrees to continue to work on their treatment plan   They have identified and are working toward their " discharge goals   yes    Visit start and stop times:    06/13/23  Start Time: 1052  Stop Time: 1130  Total Visit Time: 38 minutes

## 2023-06-13 NOTE — BH CRISIS PLAN
"Client Name: Theresa Fuchs       Client YOB: 1970  : 1970    Treatment Team (include name and contact information):     Psychotherapist: Nadia Cox    Psychiatrist: Jose Lynn   Release of information completed: yes    \" na   Release of information completed: no    Other (Specify Role): na    Release of information completed: no    Other (Specify Role): na   Release of information completed: no    Healthcare Provider  Magno Paiz MD  [address]  [facility]   Type of Plan   * Child plans (children 15 yo and younger) must be completed and signed by the child's legal guardian   * Plans for all individuals 15 yo and above must be signed by the client  Plan Type: adolescent/adult (15 and over) Initial      My Personal Strengths are (in the client's own words):  \"kind compassionate and loyal\"  The stressors and triggers that may put me at risk are:  family issues, family problems, job stress, marital problems and ongoing anxiety    Coping skills I can use to keep myself calm and safe:  Call a friend or family member and Other (describe) relaxation techniques    Coping skills/supports I can use to maintain abstinence from substance use:   Not Applicable    The people that provide me with help and support: (Include name, contact, and how they can help)   Support person #1: Son Bridgett Dill    * Phone number: in cell phone    * How can they help me? Talk and make you laugh   Support person #2:Friend Verenicebabita Urena    * Phone number: in cell phone    * How can they help me? Feel safe     Support person #3: mom    * Phone number: in cell phone    * How can they help me?  Talk to her and confide in her    In the past, the following has helped me in times of crisis:    Being with other people, Taking medications, Talking to a professional on the telephone, Calling a friend and Calling a family member      If it is an emergency and you need " immediate help, call 9-1-1    If there is a possibility of danger to yourself or others, call the following crisis hotline resources:     Adult Crisis Numbers  Suicide Prevention Hotline - Dial 9-8-8  [employer]: [address]: [address]: [address]: [phone]  [facility] [address] ([facility]): [phone]  [facility]: [address] [place] [address] Avenue: [address] [place] [address] St: [phone] (daytime)  [phone] (after hours, weekends, holidays)     Child/Adolescent Crisis Numbers   [facility]: [address]: [phone]   Marlen [place]: [phone]   [address] ([facility]): [phone]    Please note: Some [facility] do not have a separate number for Child/Adolescent specific crisis  If your county is not listed under Child/Adolescent, please call the adult number for your county     National Talk to Text Line   All Ages - [phone]    In the event your feelings become unmanageable, and you cannot reach your support system, you will call 911 immediately or go to the nearest hospital emergency room

## 2023-07-06 ENCOUNTER — OFFICE VISIT (OUTPATIENT)
Dept: PSYCHIATRY | Facility: CLINIC | Age: 53
End: 2023-07-06
Payer: COMMERCIAL

## 2023-07-06 DIAGNOSIS — F41.1 GAD (GENERALIZED ANXIETY DISORDER): ICD-10-CM

## 2023-07-06 DIAGNOSIS — F31.75 BIPOLAR 1 DISORDER, DEPRESSED, PARTIAL REMISSION (HCC): Primary | ICD-10-CM

## 2023-07-06 PROCEDURE — 99214 OFFICE O/P EST MOD 30 MIN: CPT | Performed by: PHYSICIAN ASSISTANT

## 2023-07-06 NOTE — PSYCH
This note was not shared with the patient due to reasonable likelihood of causing patient harm    PROGRESS NOTE        [facility]      Name and Date of Birth:  Ayla Donohue 52 y.o. 1970    Date of Visit: 07/06/23    SUBJECTIVE:    Jackie Pinon presents today for medication management and follow up. Two months ago she was seen by a covering provider for worsening irritability. She was switched from [place] to [place]. She reports that she has been on Jimmie consistently for the last two months. She states "I have my ups and downs but overall I'm really good." She describes more mood lability symptoms. She states "but I'm not devastated like I was with the [place], I feel more like my normal self." She would like to trial a higher dose of Vraylar. She reports that her anxiety level has been improving. She has been getting 5-6 hours of sleep per night. She does report using THC edibles 2-3 times per month. She shares that her son has moved back into the home which has been stressful but "also wonderful." Her adoptive daughter's biological father is also living back in the home. She denies suicidal ideation, intent or plan at present, has no suicidal ideation, intent or plan at present. She denies any auditory hallucinations and visual hallucinations, denies any other delusional thinking, denies any delusional thinking. She denies any side effects from medications  . HPI ROS Appetite Changes and Sleep: normal appetite, normal sleep    Review Of Systems:      Constitutional Negative   ENT Negative   Cardiovascular Negative   Respiratory Negative   Gastrointestinal Negative   Genitourinary Negative   Musculoskeletal Negative   Integumentary Negative   Neurological Negative   Endocrine Negative   Other Symptoms Negative and None       Laboratory Results: No results found for this or any previous visit.     Substance Abuse History:    Social History Substance and Sexual Activity   Drug Use Not on file       Family Psychiatric History:     No family history on file.     The following portions of the patient's history were reviewed and updated as appropriate: past family history, past medical history, past social history, past surgical history and problem list.    Social History     Socioeconomic History   • Marital status: /Civil Union     Spouse name: Not on file   • Number of children: Not on file   • Years of education: Not on file   • Highest education level: Not on file   Occupational History   • Not on file   Tobacco Use   • Smoking status: Former   • Smokeless tobacco: Never   Substance and Sexual Activity   • Alcohol use: Not on file   • Drug use: Not on file   • Sexual activity: Not on file   Other Topics Concern   • Not on file   Social History Narrative   • Not on file     Social Determinants of Health     Financial Resource Strain: Not on file   Food Insecurity: Not on file   Transportation Needs: Not on file   Physical Activity: Not on file   Stress: Not on file   Social Connections: Not on file   Intimate Partner Violence: Not on file   Housing Stability: Not on file     Social History     Social History Narrative   • Not on file        Social History     Tobacco History     Smoking Status  Former    Smokeless Tobacco Use  Never          Alcohol History     Alcohol Use Status  Not Asked          Drug Use     Drug Use Status  Not Asked          Sexual Activity     Sexually Active  Not Asked          Activities of Daily Living    Not Asked                     OBJECTIVE:     Mental Status Evaluation:    Appearance age appropriate, casually dressed   Behavior pleasant, cooperative, smiling   Speech normal volume, normal pitch   Mood euthymic   Affect Mood congruent    Thought Processes logical   Associations intact associations   Thought Content normal   Perceptual Disturbances: none   Abnormal Thoughts  Risk Potential Suicidal ideation - None  Homicidal ideation - None  Potential for aggression - No   Orientation oriented to person, place, time/date and situation   Memory recent and remote memory grossly intact   Cosciousness alert and awake   Attention Span attention span and concentration are age appropriate   Intellect Appears to be of Average Intelligence   Insight age appropriate    Judgement good    Muscle Strength and  Gait muscle strength and tone were normal   Language no difficulty naming common objects   Fund of Knowledge displays adequate knowledge of current events   Pain none   Pain Scale 0       Assessment/Plan:       Diagnoses and all orders for this visit:    Bipolar 1 disorder, depressed, partial remission (HCC)  -     cariprazine (VRAYLAR) 3 MG capsule; Take 1 capsule (3 mg total) by mouth daily    GUERDA (generalized anxiety disorder)          Treatment Recommendations/Precautions: Will increase Vraylar to 3 mg daily for mood      Continue the following medication:  Xanax 0.25 mg as needed take 1/2-1 twice a day for anxiety (patient uses this medication sparingly and does not request a refill at this time.)     We will follow-up in 1 month or sooner if questions or concerns arise. She is aware of emergent versus nonemergent mental health resources. She is able to contract for safety at this time. She will continue with her psychotherapist within this office    Risks/Benefits      Risks, Benefits And Possible Side Effects Of Medications:    Risks, benefits, and possible side effects of medications explained to patient and patient verbalizes understanding and agreement for treatment. Controlled Medication Discussion:     PDMP reviewed-no red flags  The patient understands the risk of treatment with this class of medication (xanax). They are aware of the potential for abuse.  Patient agrees not to drive or operate heavy machinery if feeling impaired, to take medication as prescribed, to not drink alcohol when taking this medication, and to not share this medication with others.     Psychotherapy Provided:     Individual psychotherapy provided: No

## 2023-07-11 ENCOUNTER — SOCIAL WORK (OUTPATIENT)
Dept: BEHAVIORAL/MENTAL HEALTH CLINIC | Facility: CLINIC | Age: 53
End: 2023-07-11
Payer: COMMERCIAL

## 2023-07-11 DIAGNOSIS — F31.30 BIPOLAR I DISORDER, MOST RECENT EPISODE DEPRESSED (HCC): Primary | ICD-10-CM

## 2023-07-11 PROCEDURE — 90834 PSYTX W PT 45 MINUTES: CPT

## 2023-07-11 NOTE — PSYCH
Behavioral Health Psychotherapy Progress Note    Psychotherapy Provided: Individual Psychotherapy     1. 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 Ct reached out to supervisor who did not get back to her until later and could not provide any help. Ct took yesterday off in protest.  Ct reports that home life is stable. Ct family friend lives with them and everyone is getting along. Ct is preparing for a  vacation in 2 weeks. Ct spoke to brother who has cancer and is not trending in a good direction. Ct is worried about him and her mother whom he lives with at this time. During this session, this clinician used the following therapeutic modalities: Cognitive Behavioral Therapy    Substance Abuse was not addressed during this session. If the client is diagnosed with a co-occurring substance use disorder, please indicate any changes in the frequency or amount of use: na. Stage of change for addressing substance use diagnoses: No substance use/Not applicable    ASSESSMENT:  Alberto Fisher presents with a Anxious mood. her affect is Normal range and intensity, which is congruent, with her mood and the content of the session. The client has made progress on their goals. No SI/HI Ramone Tao presents with a none risk of suicide, none risk of self-harm, and none risk of harm to others. For any risk assessment that surpasses a "low" rating, a safety plan must be developed. A safety plan was indicated: no  If yes, describe in detail na    PLAN: Between sessions, Alberto Fisher will manage moods/communication. At the next session, the therapist will use Cognitive Behavioral Therapy to address bipolar disorder.     Behavioral Health Treatment Plan and Discharge Planning: Alberto Fisher is aware of and agrees to continue to work on their treatment plan. They have identified and are working toward their discharge goals.  yes    Visit start and stop times:    07/11/23  Start Time: 1100  Stop Time: 1145  Total Visit Time: 45 minutes

## 2023-07-17 ENCOUNTER — TELEPHONE (OUTPATIENT)
Dept: PSYCHIATRY | Facility: CLINIC | Age: 53
End: 2023-07-17

## 2023-07-17 NOTE — TELEPHONE ENCOUNTER
Contacted patient to get 0725/23 /appt rescheduled due to Zac Lopez out of the office. Asked Patient to call back to reschedule.

## 2023-08-08 ENCOUNTER — SOCIAL WORK (OUTPATIENT)
Dept: BEHAVIORAL/MENTAL HEALTH CLINIC | Facility: CLINIC | Age: 53
End: 2023-08-08
Payer: COMMERCIAL

## 2023-08-08 DIAGNOSIS — F31.30 BIPOLAR I DISORDER, MOST RECENT EPISODE DEPRESSED (HCC): Primary | ICD-10-CM

## 2023-08-08 PROCEDURE — 90834 PSYTX W PT 45 MINUTES: CPT

## 2023-08-08 NOTE — BH TREATMENT PLAN
Outpatient Behavioral Health Psychotherapy Treatment Plan    Cecy Tao  1970     Date of Initial Psychotherapy Assessment: 9/2/2021   Date of Current Treatment Plan: 08/08/23  Treatment Plan Target Date: 2/8/2024  Treatment Plan Expiration Date: 2/8/2024    Diagnosis:   1. Bipolar I disorder, most recent episode depressed ([facility])            Area(s) of Need: Mood disorder    Long Term Goal 1 (in the client's own words): ct wants to better manage household with oldest son moving back and family friend living there    Stage of Change: Action    Target Date for completion: 2/8/2024     Anticipated therapeutic modalities: CBT     People identified to complete this goal: client and counselor      Objective 1: (identify the means of measuring success in meeting the objective): ct will make a schedule for all members of family when school starts to better manage day to day      Objective 2: (identify the means of measuring success in meeting the objective): ct will communicate with younger children daily to keep them on tasks. I am currently under the care of a [facility] psychiatric provider: yes    My [facility] psychiatric provider is: Ricardo Lujan am currently taking psychiatric medications: Yes, as prescribed    I feel that I will be ready for discharge from mental health care when I reach the following (measurable goal/objective): When I am ready    For children and adults who have a legal guardian:   Has there been any change to custody orders and/or guardianship status? No. If yes, attach updated documentation. I have created my Crisis Plan and have been offered a copy of this plan    [address]: Diagnosis and Treatment Plan explained to [address] [place] [address] acknowledges an understanding of their diagnosis. Summer Junior agrees to this treatment plan.     I have been offered a copy of this Treatment Plan. yes

## 2023-08-08 NOTE — PSYCH
Behavioral Health Psychotherapy Progress Note    Psychotherapy Provided: Individual Psychotherapy     1. Bipolar I disorder, most recent episode depressed ([address])            Goals addressed in session: Goal 1     DATA: ct shared that she had a nice vacation. Ct talked about her marriage and how she still has feelings for family friend. Ct is not working on her marriage because family friend lives there also. Ct is stressed managing household of 6 especially when school starts up again in a few weeks. Ct reports work is stressful but she is managing. Ct is unwilling to work on marriage at this time  During this session, this clinician used the following therapeutic modalities: Cognitive Behavioral Therapy    Substance Abuse was not addressed during this session. If the client is diagnosed with a co-occurring substance use disorder, please indicate any changes in the frequency or amount of use: na. Stage of change for addressing substance use diagnoses: No substance use/Not applicable    ASSESSMENT:  Nilsa Kunz presents with a Anxious mood. her affect is Normal range and intensity, which is congruent, with her mood and the content of the session. The client has made progress on their goals. No SI/HI Ju Tao presents with a none risk of suicide, none risk of self-harm, and none risk of harm to others. For any risk assessment that surpasses a "low" rating, a safety plan must be developed. A safety plan was indicated: no  If yes, describe in detail na    PLAN: Between sessions, Nilsa Kunz will manage moods/communication at home. At the next session, the therapist will use Cognitive Behavioral Therapy to address Mood disorder. Behavioral Health Treatment Plan and Discharge Planning: Nilsa Kunz is aware of and agrees to continue to work on their treatment plan. They have identified and are working toward their discharge goals.  yes    Visit start and stop times:    08/08/23  Start Time: 1100  Stop Time: 1145  Total Visit Time: 45 minutes

## 2023-08-22 ENCOUNTER — SOCIAL WORK (OUTPATIENT)
Dept: BEHAVIORAL/MENTAL HEALTH CLINIC | Facility: CLINIC | Age: 53
End: 2023-08-22
Payer: COMMERCIAL

## 2023-08-22 DIAGNOSIS — F31.30 BIPOLAR I DISORDER, MOST RECENT EPISODE DEPRESSED (HCC): Primary | ICD-10-CM

## 2023-08-22 PROCEDURE — 90834 PSYTX W PT 45 MINUTES: CPT

## 2023-08-22 NOTE — PSYCH
Behavioral Health Psychotherapy Progress Note    Psychotherapy Provided: Individual Psychotherapy     1. Bipolar I disorder, most recent episode depressed ([address])            Goals addressed in session: Goal 1     DATA: ct shared that she is worried for her friend whose brother overdosed and  in [place] on vacation 5 weeks ago. Ct friend has missed work and is still in shock. Ct encouraged friend to seek out counseling. Ct reports that home life is quiet. Kids are going back to school next week. Ct  and family friend are getting along. Ct shared that family friend still has not found a place to live and is not really looking. During this session, this clinician used the following therapeutic modalities: Cognitive Behavioral Therapy    Substance Abuse was not addressed during this session. If the client is diagnosed with a co-occurring substance use disorder, please indicate any changes in the frequency or amount of use: na. Stage of change for addressing substance use diagnoses: No substance use/Not applicable    ASSESSMENT:  Meg Stout presents with a Anxious mood. her affect is Normal range and intensity, which is congruent, with her mood and the content of the session. The client has made progress on their goals. NO SI/HI Cecy Dinh presents with a none risk of suicide, none risk of self-harm, and none risk of harm to others. For any risk assessment that surpasses a "low" rating, a safety plan must be developed. A safety plan was indicated: no  If yes, describe in detail na    PLAN: Between sessions, Meg Stout will manage moods. At the next session, the therapist will use Cognitive Behavioral Therapy to address bipolar disorder. Behavioral Health Treatment Plan and Discharge Planning: Meg Stout is aware of and agrees to continue to work on their treatment plan. They have identified and are working toward their discharge goals.  yes    Visit start and stop times:    08/22/23  Start Time: 1100  Stop Time: 1140  Total Visit Time: 40 minutes

## 2023-09-05 ENCOUNTER — SOCIAL WORK (OUTPATIENT)
Dept: BEHAVIORAL/MENTAL HEALTH CLINIC | Facility: CLINIC | Age: 53
End: 2023-09-05
Payer: COMMERCIAL

## 2023-09-05 DIAGNOSIS — F31.30 BIPOLAR I DISORDER, MOST RECENT EPISODE DEPRESSED (HCC): Primary | ICD-10-CM

## 2023-09-05 PROCEDURE — 90834 PSYTX W PT 45 MINUTES: CPT

## 2023-09-05 NOTE — PSYCH
Behavioral Health Psychotherapy Progress Note    Psychotherapy Provided: Individual Psychotherapy     1. Bipolar I disorder, most recent episode depressed ([address])            Goals addressed in session: Goal 1     DATA: ct shared that she and  are struggling with how they treat oldest daughter. Ct  tends to be more sarcastic and mean. Ct gets frustrated with daughter also but would like  to tone it down. Ct family friend still has not found housing. Ct  also made a comment about that. Ct shared about usual stress at work. Ct is also having family of origin issues with brother. During this session, this clinician used the following therapeutic modalities: Cognitive Behavioral Therapy    Substance Abuse was not addressed during this session. If the client is diagnosed with a co-occurring substance use disorder, please indicate any changes in the frequency or amount of use: na. Stage of change for addressing substance use diagnoses: No substance use/Not applicable    ASSESSMENT:  Isaura Serrano presents with a Anxious mood. her affect is Normal range and intensity, which is congruent, with her mood and the content of the session. The client has made progress on their goals. No SI/HI Florencio Tao presents with a none risk of suicide, none risk of self-harm, and none risk of harm to others. For any risk assessment that surpasses a "low" rating, a safety plan must be developed. A safety plan was indicated: no  If yes, describe in detail na    PLAN: Between sessions, Isaura Serrano will manage moods/communication. At the next session, the therapist will use Cognitive Behavioral Therapy to address moods. Behavioral Health Treatment Plan and Discharge Planning: Isaura Serrano is aware of and agrees to continue to work on their treatment plan. They have identified and are working toward their discharge goals.  yes    Visit start and stop times:    09/05/23  Start Time: 1050  Stop Time: 1140  Total Visit Time: 50 minutes

## 2023-10-03 ENCOUNTER — SOCIAL WORK (OUTPATIENT)
Dept: BEHAVIORAL/MENTAL HEALTH CLINIC | Facility: CLINIC | Age: 53
End: 2023-10-03
Payer: COMMERCIAL

## 2023-10-03 DIAGNOSIS — F31.30 BIPOLAR I DISORDER, MOST RECENT EPISODE DEPRESSED (HCC): Primary | ICD-10-CM

## 2023-10-03 PROCEDURE — 90834 PSYTX W PT 45 MINUTES: CPT

## 2023-10-03 NOTE — PSYCH
Behavioral Health Psychotherapy Progress Note    Psychotherapy Provided: Individual Psychotherapy     1. Bipolar I disorder, most recent episode depressed ([address])            Goals addressed in session: Goal 1     DATA: ct shared that she is unhappy with . Ct is not sure if she does not love him anymore. Ct still has emotional relationship with family friend. Ct and I went over various options and ct will talk to  about her feelings. Ct  also has some issues with wife and we talked that she needs to fully understand those issues instead of seeing things one way. Ct oldest daughter is still a stressor at home but seems to be doing well in HS. Ct youngest daughter is doing well. Ct feels stable on med's. During this session, this clinician used the following therapeutic modalities: Cognitive Behavioral Therapy    Substance Abuse was not addressed during this session. If the client is diagnosed with a co-occurring substance use disorder, please indicate any changes in the frequency or amount of use: na. Stage of change for addressing substance use diagnoses: No substance use/Not applicable    ASSESSMENT:  Keely Miller presents with a Anxious and Depressed mood. her affect is Flat and Tearful, which is congruent, with her mood and the content of the session. The client has made progress on their goals. No SI/HI Raenelle Cousinfabien Tao presents with a none risk of suicide, none risk of self-harm, and none risk of harm to others. For any risk assessment that surpasses a "low" rating, a safety plan must be developed. A safety plan was indicated: no  If yes, describe in detail na    PLAN: Between sessions, Keely Miller will manage moods/communication. At the next session, the therapist will use Cognitive Behavioral Therapy to address Moods and marriage.     Behavioral Health Treatment Plan and Discharge Planning: Keely Miller is aware of and agrees to continue to work on their treatment plan. They have identified and are working toward their discharge goals.  yes    Visit start and stop times:    10/03/23  Start Time: 1055  Stop Time: 1143  Total Visit Time: 48 minutes

## 2023-10-13 DIAGNOSIS — F31.75 BIPOLAR 1 DISORDER, DEPRESSED, PARTIAL REMISSION (HCC): ICD-10-CM

## 2023-10-13 NOTE — TELEPHONE ENCOUNTER
Medication Refill Request     Name of Medication cariprazine (VRAYLAR) 3 MG capsule (   Dose/Frequency     Take 1 capsule (3 mg total) by mouth daily     Quantity 30  Verified pharmacy   [x]  Verified ordering Provider   [x]  Does patient have enough for the next 3 days?  Yes [] No [x]  Does patient have a follow-up kan: 11/7/23 @ 2 PM

## 2023-10-17 ENCOUNTER — SOCIAL WORK (OUTPATIENT)
Dept: BEHAVIORAL/MENTAL HEALTH CLINIC | Facility: CLINIC | Age: 53
End: 2023-10-17
Payer: COMMERCIAL

## 2023-10-17 DIAGNOSIS — F31.30 BIPOLAR I DISORDER, MOST RECENT EPISODE DEPRESSED (HCC): Primary | ICD-10-CM

## 2023-10-17 PROCEDURE — 90834 PSYTX W PT 45 MINUTES: CPT

## 2023-10-17 NOTE — PSYCH
Behavioral Health Psychotherapy Progress Note    Psychotherapy Provided: Individual Psychotherapy     1. Bipolar I disorder, most recent episode depressed ([address])            Goals addressed in session: Goal 1     DATA: ct shared that her  has asked family friend to move out by November 1st.  Ct at first did not like it but agrees that it is time. However she is still enabling and thinking of ways she can help family friend. Ct is making an effort to work on relationship wit  but is not communicating with him in regards to her own needs. Ct feels that he should reciprocate because she is trying. 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 During this session, this clinician used the following therapeutic modalities: Cognitive Behavioral Therapy    Substance Abuse was not addressed during this session. If the client is diagnosed with a co-occurring substance use disorder, please indicate any changes in the frequency or amount of use: na. Stage of change for addressing substance use diagnoses: No substance use/Not applicable    ASSESSMENT:  Mary Jane Simmons presents with a Anxious and Depressed mood. her affect is Flat, which is congruent, with her mood and the content of the session. The client has made progress on their goals. No SI/HI Teresatayler Tao presents with a none risk of suicide, none risk of self-harm, and none risk of harm to others. For any risk assessment that surpasses a "low" rating, a safety plan must be developed. A safety plan was indicated: no  If yes, describe in detail na    PLAN: Between sessions, Mary Jane Simmons will manage moods/boundaries/communication. At the next session, the therapist will use Cognitive Behavioral Therapy to address mood instability.     Behavioral Health Treatment Plan and Discharge Planning: Mary Jane Simmons is aware of and agrees to continue to work on their treatment plan. They have identified and are working toward their discharge goals.  yes    Visit start and stop times:    10/17/23  Start Time: 1055  Stop Time: 1140  Total Visit Time: 45 minutes

## 2023-11-09 ENCOUNTER — TELEPHONE (OUTPATIENT)
Dept: PSYCHIATRY | Facility: CLINIC | Age: 53
End: 2023-11-09

## 2023-11-14 ENCOUNTER — SOCIAL WORK (OUTPATIENT)
Dept: BEHAVIORAL/MENTAL HEALTH CLINIC | Facility: CLINIC | Age: 53
End: 2023-11-14
Payer: COMMERCIAL

## 2023-11-14 DIAGNOSIS — F31.30 BIPOLAR I DISORDER, MOST RECENT EPISODE DEPRESSED (HCC): Primary | ICD-10-CM

## 2023-11-14 PROCEDURE — 90832 PSYTX W PT 30 MINUTES: CPT

## 2023-11-14 NOTE — PSYCH
Behavioral Health Psychotherapy Progress Note    Psychotherapy Provided: Individual Psychotherapy     1. Bipolar I disorder, most recent episode depressed ([address])            Goals addressed in session: Goal 1     DATA: ct shared that her son and her had severe arguments over family friend staying in the house. Ct family friend was supposed to have moved out on November 1. Ct  intervened and family friend has until this week to move out. Ct is stressed because he cannot find a place to live. Ct son apologized to ct. We ended session early today because ct ws not feeling well. Ct said she aha a cold but started to feel hot. During this session, this clinician used the following therapeutic modalities: Cognitive Processing Therapy    Substance Abuse was not addressed during this session. If the client is diagnosed with a co-occurring substance use disorder, please indicate any changes in the frequency or amount of use: na. Stage of change for addressing substance use diagnoses: No substance use/Not applicable    ASSESSMENT:  Isaura Serrano presents with a Anxious and Depressed mood. her affect is Flat, which is congruent, with her mood and the content of the session. The client has made progress on their goals. No SI/HI [place] Delnao Tao presents with a none risk of suicide, none risk of self-harm, and none risk of harm to others. For any risk assessment that surpasses a "low" rating, a safety plan must be developed. A safety plan was indicated: no  If yes, describe in detail na    PLAN: Between sessions, Isaura Serrano will manage moods/family issues. At the next session, the therapist will use Cognitive Behavioral Therapy to address mood instability. Behavioral Health Treatment Plan and Discharge Planning: Isaura Serrano is aware of and agrees to continue to work on their treatment plan. They have identified and are working toward their discharge goals.  yes    Visit start and stop times:    11/14/23  Start Time: 1056  Stop Time: 1118  Total Visit Time: 22 minutes

## 2023-11-28 ENCOUNTER — SOCIAL WORK (OUTPATIENT)
Dept: BEHAVIORAL/MENTAL HEALTH CLINIC | Facility: CLINIC | Age: 53
End: 2023-11-28
Payer: COMMERCIAL

## 2023-11-28 DIAGNOSIS — F31.30 BIPOLAR I DISORDER, MOST RECENT EPISODE DEPRESSED (HCC): Primary | ICD-10-CM

## 2023-11-28 PROCEDURE — 90834 PSYTX W PT 45 MINUTES: CPT

## 2023-11-28 NOTE — PSYCH
Behavioral Health Psychotherapy Progress Note    Psychotherapy Provided: Individual Psychotherapy     1. Bipolar I disorder, most recent episode depressed ([address])            Goals addressed in session: Goal 1     DATA: ct shared that the family friend who is living with them still has not found a place to live. To make matters worse family friend got drink last Saturday and passed out outside. Luckily other then some bruises he is alright. Ct youngest daughter talked to ct and  about her feelings related to family friend who is her biological father. Ct is in the middle of starting three par time jobs. Ct was too stressed working at the [employer] [facility] [employer] full time due to poor staff management and lack of support. During this session, this clinician used the following therapeutic modalities: Cognitive Behavioral Therapy    Substance Abuse was not addressed during this session. If the client is diagnosed with a co-occurring substance use disorder, please indicate any changes in the frequency or amount of use: na. Stage of change for addressing substance use diagnoses: No substance use/Not applicable    ASSESSMENT:  Brenda Younger presents with a Anxious mood. her affect is Normal range and intensity, which is congruent, with her mood and the content of the session. The client has made progress on their goals. No SI/HI Getachew Enrrique Tao presents with a none risk of suicide, none risk of self-harm, and none risk of harm to others. For any risk assessment that surpasses a "low" rating, a safety plan must be developed. A safety plan was indicated: no  If yes, describe in detail na    PLAN: Between sessions, Brenda Younger will manage moods/communication . At the next session, the therapist will use Cognitive Behavioral Therapy to address Mood disorder. Behavioral Health Treatment Plan and Discharge Planning: Brenda Younger is aware of and agrees to continue to work on their treatment plan.  They have identified and are working toward their discharge goals.  yes    Visit start and stop times:    11/28/23  Start Time: 1057  Stop Time: 1140  Total Visit Time: 43 minutes

## 2023-12-12 ENCOUNTER — SOCIAL WORK (OUTPATIENT)
Dept: BEHAVIORAL/MENTAL HEALTH CLINIC | Facility: CLINIC | Age: 53
End: 2023-12-12
Payer: COMMERCIAL

## 2023-12-12 DIAGNOSIS — F31.30 BIPOLAR I DISORDER, MOST RECENT EPISODE DEPRESSED (HCC): Primary | ICD-10-CM

## 2023-12-12 PROCEDURE — 90834 PSYTX W PT 45 MINUTES: CPT

## 2023-12-12 NOTE — PSYCH
Behavioral Health Psychotherapy Progress Note    Psychotherapy Provided: Individual Psychotherapy     1. Bipolar I disorder, most recent episode depressed ([address])            Goals addressed in session: Goal 1     DATA: ct shared that family friend moved out this past Sunday. Ct is upset and is transitioning. Ct still sees him often and  is upset because he feels she should be doing less. We discussed that because ct has different jobs family friend needs to become more resourceful and less reliant on ct. Ct is liking her jobs thus far. Ct reports that oldest daughter completed her at home training and may start working with a  in the new year. Ct will spend time with family this Christmas. During this session, this clinician used the following therapeutic modalities: Cognitive Behavioral Therapy    Substance Abuse was not addressed during this session. If the client is diagnosed with a co-occurring substance use disorder, please indicate any changes in the frequency or amount of use: na. Stage of change for addressing substance use diagnoses: No substance use/Not applicable    ASSESSMENT:  Ezequiel Melo presents with a Depressed mood. her affect is Flat, which is congruent, with her mood and the content of the session. The client has made progress on their goals. No SI/HI Uma Tao presents with a none risk of suicide, none risk of self-harm, and none risk of harm to others. For any risk assessment that surpasses a "low" rating, a safety plan must be developed. A safety plan was indicated: no  If yes, describe in detail na    PLAN: Between sessions, Ezequiel Melo will manage moods/communication. At the next session, the therapist will use Cognitive Behavioral Therapy to address depression. Behavioral Health Treatment Plan and Discharge Planning: Ezequiel Melo is aware of and agrees to continue to work on their treatment plan.  They have identified and are working toward their discharge goals.  yes    Visit start and stop times:    12/12/23  Start Time: 1053  Stop Time: 1134  Total Visit Time: 41 minutes

## 2023-12-14 ENCOUNTER — OFFICE VISIT (OUTPATIENT)
Dept: PSYCHIATRY | Facility: CLINIC | Age: 53
End: 2023-12-14
Payer: COMMERCIAL

## 2023-12-14 ENCOUNTER — TELEPHONE (OUTPATIENT)
Dept: PSYCHIATRY | Facility: CLINIC | Age: 53
End: 2023-12-14

## 2023-12-14 DIAGNOSIS — F31.75 BIPOLAR 1 DISORDER, DEPRESSED, PARTIAL REMISSION (HCC): ICD-10-CM

## 2023-12-14 DIAGNOSIS — F41.1 GAD (GENERALIZED ANXIETY DISORDER): Primary | ICD-10-CM

## 2023-12-14 PROCEDURE — 99214 OFFICE O/P EST MOD 30 MIN: CPT | Performed by: PHYSICIAN ASSISTANT

## 2023-12-14 RX ORDER — LEVOTHYROXINE SODIUM 0.07 MG/1
75 TABLET ORAL DAILY
COMMUNITY
Start: 2023-11-25

## 2023-12-14 NOTE — PSYCH
This note was not shared with the patient due to reasonable likelihood of causing patient harm     PROGRESS NOTE        [address]      Name and Date of Birth:  Brenda Younger 52 y.o. 1970    Date of Visit: 12/14/23    SUBJECTIVE:    Cecy presents today for medication management and follow-up. She reports "I have been doing good."  She does share that she left her previous job. 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 This is her first week so she is getting used to her new schedule. She denies any changes with sleep or appetite. She recently trialed a weight loss medication phentermine and had a similar reaction to when she took Latuda (tongue swelling). She is disappointed as she was previously on injectable medications for weight loss and they were helpful until they became unavailable. She mentions that her daughter's biological father moved out of the home recently. She shares that he has moved close by but she is "really sad that he is gone."  She does mention that they still spend a significant amount of time together. She reports "but my  was not willing to continue having him live with us."    She reports stable behavior and symptoms on Vraylar. She states "this medication is wonderful."  She denies any symptoms of dale, depression, or excessive anxiety. She has not needed to use her as needed Xanax. Previously with past medication she was having difficulty going out in the sun and now she has no issues. At this time she would like to remain on her current regimen. She denies suicidal ideation, intent or plan at present, has no suicidal ideation, intent or plan at present.     She denies any auditory hallucinations and visual hallucinations, denies any other delusional thinking, denies any delusional thinking. She denies any side effects from medications  . HPI ROS Appetite Changes and Sleep: normal appetite, normal sleep    Review Of Systems:      Constitutional Negative   ENT Negative   Cardiovascular Negative   Respiratory Negative   Gastrointestinal Negative   Genitourinary Negative   Musculoskeletal Negative   Integumentary Negative   Neurological Negative   Endocrine Negative   Other Symptoms Negative and None       Laboratory Results: No results found for this or any previous visit. Substance Abuse History:    Social History     Substance and Sexual Activity   Drug Use Not on file       Family Psychiatric History:     History reviewed. No pertinent family history.     The following portions of the patient's history were reviewed and updated as appropriate: past family history, past medical history, past social history, past surgical history and problem list.    Social History     Socioeconomic History    Marital status: /Civil Union     Spouse name: Not on file    Number of children: Not on file    Years of education: Not on file    Highest education level: Not on file   Occupational History    Not on file   Tobacco Use    Smoking status: Former    Smokeless tobacco: Never   Substance and Sexual Activity    Alcohol use: Not on file    Drug use: Not on file    Sexual activity: Not on file   Other Topics Concern    Not on file   Social History Narrative    Not on file     Social Determinants of Health     Financial Resource Strain: Not on file   Food Insecurity: Not on file   Transportation Needs: Not on file   Physical Activity: Not on file   Stress: Not on file   Social Connections: Not on file   Intimate Partner Violence: Not on file   Housing Stability: Not on file     Social History     Social History Narrative    Not on file        Social History       Tobacco History       Smoking Status  Former      Smokeless Tobacco Use  Never              Alcohol History       Alcohol Use Status  Not Asked              Drug Use       Drug Use Status  Not Asked              Sexual Activity       Sexually Active  Not Asked              Activities of Daily Living    Not Asked                       OBJECTIVE:     Mental Status Evaluation:    Appearance age appropriate, casually dressed   Behavior pleasant, cooperative   Speech normal volume, normal pitch   Mood Euthymic   Affect Mood congruent   Thought Processes logical   Associations intact associations   Thought Content normal   Perceptual Disturbances: none   Abnormal Thoughts  Risk Potential Suicidal ideation - None  Homicidal ideation - None  Potential for aggression - No   Orientation oriented to person, place, time/date and situation   Memory recent and remote memory grossly intact   Cosciousness alert and awake   Attention Span attention span and concentration are age appropriate   Intellect Appears to be of Average Intelligence   Insight age appropriate    Judgement good    Muscle Strength and  Gait muscle strength and tone were normal   Language no difficulty naming common objects   Fund of Knowledge displays adequate knowledge of current events   Pain none   Pain Scale 0       Assessment/Plan:       Diagnoses and all orders for this visit:    GUERDA (generalized anxiety disorder)    Bipolar 1 disorder, depressed, partial remission (HCC)  -     cariprazine (VRAYLAR) 3 MG capsule; Take 1 capsule (3 mg total) by mouth daily    Other orders  -     levothyroxine 75 mcg tablet; Take 75 mcg by mouth daily          Treatment Recommendations/Precautions:    Continue the following medication:    Vraylar 3 mg daily for mood  Xanax 0.25 mg as needed take 1/2-1 twice a day for anxiety (patient uses this medication sparingly and does not request a refill at this time.)     We will follow-up in 3 months or sooner if questions or concerns arise.   She is aware of emergent versus nonemergent mental health resources. She is able to contract for safety at this time. She will continue with her psychotherapist within this office    Risks/Benefits      Risks, Benefits And Possible Side Effects Of Medications:    Risks, benefits, and possible side effects of medications explained to patient and patient verbalizes understanding and agreement for treatment. Controlled Medication Discussion:      reviewed-no red flags  The patient understands the risk of treatment with this class of medication. They are aware of the potential for abuse. Patient agrees not to drive or operate heavy machinery if feeling impaired, to take medication as prescribed, to not drink alcohol when taking this medication, and to not share this medication with others. Psychotherapy Provided:     Individual psychotherapy provided: No    This note was completed in part utilizing [employer]. Grammatical, translation, syntax errors, random word insertions, spelling mistakes, and incomplete sentences may be an occasional consequence of this system secondary to software limitations with voice recognition, ambient noise, and hardware issues. If you have any questions or concerns about the content, text, or information contained within the body of this dictation, please contact the provider for clarification.

## 2023-12-14 NOTE — TELEPHONE ENCOUNTER
Patient called and wanted an earlier appointment today with Mateusz Cardenas PA-C from 11:30 am.  Scheduled for 10:00 am today.

## 2024-01-23 ENCOUNTER — SOCIAL WORK (OUTPATIENT)
Dept: BEHAVIORAL/MENTAL HEALTH CLINIC | Facility: CLINIC | Age: 54
End: 2024-01-23
Payer: COMMERCIAL

## 2024-01-23 DIAGNOSIS — F31.30 BIPOLAR I DISORDER, MOST RECENT EPISODE DEPRESSED (HCC): Primary | ICD-10-CM

## 2024-01-23 PROCEDURE — 90834 PSYTX W PT 45 MINUTES: CPT

## 2024-01-23 NOTE — PSYCH
"Behavioral Health Psychotherapy Progress Note    Psychotherapy Provided: Individual Psychotherapy     1. Bipolar I disorder, most recent episode depressed (HCC)            Goals addressed in session: Goal 1     DATA: ct shared that the Holidays went well.  Ct reports that family friend has been moved out.  Ct  ask that ct not drive him to work everyday.  Ct is hoping family friend can arrange ride with family members.  Ct may still pick him up from work.  Ct reports that rumor started at work regarding her and a colleague started by someone that ct claims does not like her.  Ct went to work today and filed a complaint with an .  Ct shared that kids are doing well and that the oldest daughter is getting a job at a nursing home help with recreation.  During this session, this clinician used the following therapeutic modalities: Cognitive Behavioral Therapy    Substance Abuse was not addressed during this session. If the client is diagnosed with a co-occurring substance use disorder, please indicate any changes in the frequency or amount of use: na. Stage of change for addressing substance use diagnoses: No substance use/Not applicable    ASSESSMENT:  Cecy Tao presents with a Anxious mood.     her affect is Normal range and intensity, which is congruent, with her mood and the content of the session. The client has made progress on their goals.    No SI/HI Cecy Tao presents with a none risk of suicide, none risk of self-harm, and none risk of harm to others.    For any risk assessment that surpasses a \"low\" rating, a safety plan must be developed.    A safety plan was indicated: no  If yes, describe in detail na    PLAN: Between sessions, Cecy Tao will manage moods/communication. At the next session, the therapist will use Cognitive Behavioral Therapy to address mood disruption/family communication.    Behavioral Health Treatment Plan and Discharge Planning: Cecy Tao is aware " of and agrees to continue to work on their treatment plan. They have identified and are working toward their discharge goals. yes    Visit start and stop times:    01/23/24  Start Time: 1050

## 2024-01-23 NOTE — BH TREATMENT PLAN
Outpatient Behavioral Health Psychotherapy Treatment Plan    Cecy Tao  1970     Date of Initial Psychotherapy Assessment: 9/2/2021   Date of Current Treatment Plan: 01/23/24  Treatment Plan Target Date: 7/23/2024  Treatment Plan Expiration Date: 7/23/2024    Diagnosis:   1. Bipolar I disorder, most recent episode depressed (HCC)            Area(s) of Need: Family/marriage    Long Term Goal 1 (in the client's own words): I want to improve communication with  and family members    Stage of Change: Preparation    Target Date for completion: 7/23/2024     Anticipated therapeutic modalities: CBT     People identified to complete this goal: client and counselor      Objective 1: (identify the means of measuring success in meeting the objective): ct will make time each day to talk to   about family and relationship      Objective 2: (identify the means of measuring success in meeting the objective): ct will work on spending less time away from home and more time home with          I am currently under the care of a [facility] psychiatric provider: yes    My [facility] psychiatric provider is: Waleska Limon    I am currently taking psychiatric medications: Yes, as prescribed    I feel that I will be ready for discharge from mental health care when I reach the following (measurable goal/objective): When I am able to manage everything    For children and adults who have a legal guardian:   Has there been any change to custody orders and/or guardianship status? No. If yes, attach updated documentation.    I have created my Crisis Plan and have been offered a copy of this plan    Behavioral Health Treatment Plan [facility]: Diagnosis and Treatment Plan explained to Cecy Tao acknowledges an understanding of their diagnosis. Cecy Tao agrees to this treatment plan.    I have been offered a copy of this Treatment Plan. yes

## 2024-01-30 ENCOUNTER — TELEPHONE (OUTPATIENT)
Dept: PSYCHIATRY | Facility: CLINIC | Age: 54
End: 2024-01-30

## 2024-01-30 NOTE — TELEPHONE ENCOUNTER
Received call from patient stating that she needed an appointment for Zac Lopez LPC.  Patient scheduled for Wednesday 1/30/2024 at 11 am.

## 2024-01-31 ENCOUNTER — SOCIAL WORK (OUTPATIENT)
Dept: BEHAVIORAL/MENTAL HEALTH CLINIC | Facility: CLINIC | Age: 54
End: 2024-01-31
Payer: COMMERCIAL

## 2024-01-31 DIAGNOSIS — F31.30 BIPOLAR I DISORDER, MOST RECENT EPISODE DEPRESSED (HCC): Primary | ICD-10-CM

## 2024-01-31 PROCEDURE — 90834 PSYTX W PT 45 MINUTES: CPT

## 2024-01-31 NOTE — PSYCH
"Behavioral Health Psychotherapy Progress Note    Psychotherapy Provided: Individual Psychotherapy     1. Bipolar I disorder, most recent episode depressed (HCC)            Goals addressed in session: Goal 1     DATA: ct found out from family friends sister that he is in love with ct.  Ct confirmed this with family friend.  Ct asked  for a divorce and he is unwilling.  Ct also told adult son and 2 teenage daughters.  Ct daughters were \"hysterical\" crying and carrying on.  Cy oldest daughter asked if it was because of her.  Ct and I talked about various ways to manage and move forward.  It appears that she will stay in marriage.   suspects something is going on with family friend and he may not be visiting as much or at all.  Ct is encouraged to not be impulsive and to consider everyone feelings when discussing this  and other issues going forward.  During this session, this clinician used the following therapeutic modalities: Cognitive Behavioral Therapy    Substance Abuse was not addressed during this session. If the client is diagnosed with a co-occurring substance use disorder, please indicate any changes in the frequency or amount of use: na. Stage of change for addressing substance use diagnoses: No substance use/Not applicable    ASSESSMENT:  Cecy Tao presents with a Depressed mood.     her affect is Flat and Tearful, which is congruent, with her mood and the content of the session. The client has made progress on their goals.    No SI/HI Cecy Tao presents with a none risk of suicide, none risk of self-harm, and none risk of harm to others.    For any risk assessment that surpasses a \"low\" rating, a safety plan must be developed.    A safety plan was indicated: no  If yes, describe in detail na    PLAN: Between sessions, Cecy Tao will manage moods/family issues/feelings. At the next session, the therapist will use Cognitive Behavioral Therapy to address Bipolar " disorder.    Behavioral Health Treatment Plan and Discharge Planning: Cecy Tao is aware of and agrees to continue to work on their treatment plan. They have identified and are working toward their discharge goals. yes    Visit start and stop times:    01/31/24  Start Time: 1100  Stop Time: 1145  Total Visit Time: 45 minutes

## 2024-02-06 ENCOUNTER — SOCIAL WORK (OUTPATIENT)
Dept: BEHAVIORAL/MENTAL HEALTH CLINIC | Facility: CLINIC | Age: 54
End: 2024-02-06
Payer: COMMERCIAL

## 2024-02-06 DIAGNOSIS — F31.30 BIPOLAR I DISORDER, MOST RECENT EPISODE DEPRESSED (HCC): Primary | ICD-10-CM

## 2024-02-06 PROCEDURE — 90834 PSYTX W PT 45 MINUTES: CPT

## 2024-02-06 NOTE — PSYCH
"Behavioral Health Psychotherapy Progress Note    Psychotherapy Provided: Individual Psychotherapy     No diagnosis found.    Goals addressed in session: Goal 1     DATA: ct shared that  has been very attentive to her.  Ct shared that she and friend put rules into place to not escalate relationship.  Ct friend recently lost his job and ct is upset because that will put pressure on her to support him which she can't.  Ct is also concerned if he will relapse.  Ct daughter recently acted out and ct handled it well.  Ct is going to stay in marriage for the sake of the kids and due to finances.  Ct is encouraged to practice self care and be there for the kids.    During this session, this clinician used the following therapeutic modalities: Cognitive Behavioral Therapy    Substance Abuse was not addressed during this session. If the client is diagnosed with a co-occurring substance use disorder, please indicate any changes in the frequency or amount of use: na. Stage of change for addressing substance use diagnoses: No substance use/Not applicable    ASSESSMENT:  Cecy Tao presents with a Anxious and Depressed mood.     her affect is Flat, which is congruent, with her mood and the content of the session. The client has made progress on their goals.    No SI/HI Cecy Tao presents with a none risk of suicide, none risk of self-harm, and none risk of harm to others.    For any risk assessment that surpasses a \"low\" rating, a safety plan must be developed.    A safety plan was indicated: no  If yes, describe in detail na    PLAN: Between sessions, Cecy Tao will manage moods/marriage. At the next session, the therapist will use Cognitive Behavioral Therapy to address depression/marriage.    Behavioral Health Treatment Plan and Discharge Planning: Cecy Tao is aware of and agrees to continue to work on their treatment plan. They have identified and are working toward their discharge goals. " yes    Visit start and stop times:    02/06/24

## 2024-02-06 NOTE — BH CRISIS PLAN
Client Name: Cecy Tao       Client YOB: 1970    [facility] Safety Plan      Creation Date: 2/6/24 Update Date: 1/6/25   Created By: Zac Lopez LPC Last Updated By: Zac Lopez LPC      Step 1: Warning Signs:   Warning Signs   depressed mood            Step 2: Internal Coping Strategies:   Internal Coping Strategies   take a nap   take a bathAdrian            Step 3: People and social settings that provide distraction:   Name Contact Information   Navin in cell   Heather in cell            Step 4: People whom I can ask for help during a crisis:      Name Contact Information    Navin in cell      Step 5: Professionals or agencies I can contact during a crisis:      Clinican/Agency Name Phone Emergency Contact    [facility] [phone]     Zac [phone]       Local Emergency Department Emergency Department Phone Emergency Department Address    [facility] [phone]         Crisis Phone Numbers:   Suicide Prevention Lifeline: Call or Text  452 Crisis Text Line: Text HOME to 583-086   Please note: Some [facility] do not have a separate number for Child/Adolescent specific crisis. If your county is not listed under Child/Adolescent, please call the adult number for your county      Adult Crisis Numbers: Child/Adolescent Crisis Numbers   [facility]: [phone] [facility]: [phone]   [facility]: [phone] [facility]: [phone]   [facility]: [phone] [place]: [phone]   [facility]: [phone] [place]: [phone]   [place]: [phone]   [facility]: [phone]   [facility]: [phone]   [facility]: [phone] (daytime) [phone] (after hours, weekends, holidays)      Step 6: Making the environment safer (plan for lethal means safety):   Patient did not identify any lethal methods: Yes     Optional: What is most important to me and worth living for?   My  [facility] [place]     [facility] Safety Plan. Nayeli Burton and Ben Stone. Used with permission of the authors.

## 2024-02-20 ENCOUNTER — SOCIAL WORK (OUTPATIENT)
Dept: BEHAVIORAL/MENTAL HEALTH CLINIC | Facility: CLINIC | Age: 54
End: 2024-02-20
Payer: COMMERCIAL

## 2024-02-20 DIAGNOSIS — F31.30 BIPOLAR I DISORDER, MOST RECENT EPISODE DEPRESSED (HCC): Primary | ICD-10-CM

## 2024-02-20 PROCEDURE — 90834 PSYTX W PT 45 MINUTES: CPT

## 2024-02-20 NOTE — PSYCH
"Behavioral Health Psychotherapy Progress Note    Psychotherapy Provided: Individual Psychotherapy     1. Bipolar I disorder, most recent episode depressed (HCC)            Goals addressed in session: Goal 1     DATA: ct shared that she is acting as if things are normal at home.  However ct  is trying to work on marriage and making plans and wanting to do things with wife.  Ct spends time with friend who was recently let go from his job and now ct is absorbing his expenses.  She is hoping he will get a job soon.  Ct girls are doing well.  Ct will start a full time job in 2 weeks and no longer have three aprt time jobs.  Ct will have one full time and one part time job.  Ct reports normal sleep schedule.    During this session, this clinician used the following therapeutic modalities: Cognitive Behavioral Therapy    Substance Abuse was not addressed during this session. If the client is diagnosed with a co-occurring substance use disorder, please indicate any changes in the frequency or amount of use: na. Stage of change for addressing substance use diagnoses: No substance use/Not applicable    ASSESSMENT:  Cecy Tao presents with a Anxious mood.     her affect is Normal range and intensity, which is congruent, with her mood and the content of the session. The client has made progress on their goals.    No SI/HI Cecy Tao presents with a none risk of suicide, none risk of self-harm, and none risk of harm to others.    For any risk assessment that surpasses a \"low\" rating, a safety plan must be developed.    A safety plan was indicated: no  If yes, describe in detail na    PLAN: Between sessions, Cecy Tao will manage moods. At the next session, the therapist will use Cognitive Behavioral Therapy to address Bipolar disorder.    Behavioral Health Treatment Plan and Discharge Planning: Cecy Tao is aware of and agrees to continue to work on their treatment plan. They have identified and are " working toward their discharge goals. yes    Visit start and stop times:    02/20/24  Start Time: 1058  Stop Time: 1145  Total Visit Time: 47 minutes

## 2024-03-13 ENCOUNTER — TELEMEDICINE (OUTPATIENT)
Dept: PSYCHIATRY | Facility: CLINIC | Age: 54
End: 2024-03-13
Payer: COMMERCIAL

## 2024-03-13 DIAGNOSIS — F41.1 GAD (GENERALIZED ANXIETY DISORDER): ICD-10-CM

## 2024-03-13 DIAGNOSIS — F31.75 BIPOLAR 1 DISORDER, DEPRESSED, PARTIAL REMISSION (HCC): Primary | ICD-10-CM

## 2024-03-13 PROCEDURE — 99214 OFFICE O/P EST MOD 30 MIN: CPT | Performed by: PHYSICIAN ASSISTANT

## 2024-03-13 NOTE — PSYCH
This note was not shared with the patient due to reasonable likelihood of causing patient harm     Virtual Regular Visit    Problem List Items Addressed This Visit       Bipolar 1 disorder, depressed, partial remission (HCC) - Primary    Relevant Medications    cariprazine (VRAYLAR) 3 MG capsule     Other Visit Diagnoses       GUERDA (generalized anxiety disorder)        Relevant Medications    cariprazine (VRAYLAR) 3 MG capsule          Reason for visit is   Chief Complaint   Patient presents with    Medication Management    Follow-up     Encounter provider Waleska Limon PA-C    Provider located at [address]  [facility] [address] [phone]    Recent Visits  No visits were found meeting these conditions.  Showing recent visits within past 7 days and meeting all other requirements  Today's Visits  Date Type Provider Dept   03/13/24 Telemedicine Waleska Limon PA-C  [facility]iatric [facility]   Showing today's visits and meeting all other requirements  Future Appointments  No visits were found meeting these conditions.  Showing future appointments within next 150 days and meeting all other requirements       After connecting through [employer], the patient was identified by name and date of birth. Cecy Tao was informed that this is a telemedicine visit and that the visit is being conducted through the Epic Embedded platform. She agrees to proceed. which may not be secure and therefore, might not be HIPAA-compliant.  My office door was closed. No one else was in the room.  She acknowledged consent and understanding of privacy and security of the video platform. The patient has agreed to participate and understands they can discontinue the visit at any time.    SUBJECTIVE:    Cecy Tao is a 53 y.o. female with a history of bipolar disorder, and anxiety who presents for virtual follow-up today.   "Cecy states \"I am doing really good.\"  She reports that her new job is much less stressful than her previous job.  She denies any significant depressive or anxiety symptoms.  She has not needed to use her Xanax often.  She has been consistent with her Vraylar and feels that it is helpful for her mood.  She denies any symptoms of dale.  She denies any persistent irritability.  She continues to follow with her psychotherapist on a consistent basis.    She has been sleeping and getting adequate nutrition.    She denies any suicidal or homicidal thought, plan, or intent.    No medication side effects.    No auditory or visual hallucinations.  No delusions.    HPI ROS Appetite Changes and Sleep: normal appetite, normal energy level, and normal number of sleep hours    Review Of Systems:     Constitutional Negative   ENT Negative   Cardiovascular Negative   Respiratory Negative   Gastrointestinal Negative   Genitourinary Negative   Musculoskeletal Negative   Integumentary Negative   Neurological Negative   Endocrine Negative   Other Symptoms Negative and None        Substance Abuse History:    Social History     Substance and Sexual Activity   Drug Use Not on file       Family Psychiatric History:     History reviewed. No pertinent family history.    Social History     Socioeconomic History    Marital status: /Civil Union     Spouse name: Not on file    Number of children: Not on file    Years of education: Not on file    Highest education level: Not on file   Occupational History    Not on file   Tobacco Use    Smoking status: Former    Smokeless tobacco: Never   Substance and Sexual Activity    Alcohol use: Not on file    Drug use: Not on file    Sexual activity: Not on file   Other Topics Concern    Not on file   Social History Narrative    Not on file     Social Determinants of Health     Financial Resource Strain: Not on file   Food Insecurity: Not on file   Transportation Needs: Not on file   Physical " Activity: Not on file   Stress: Not on file   Social Connections: Not on file   Intimate Partner Violence: Not on file   Housing Stability: Not on file       Past Medical History:   Diagnosis Date    Bipolar 1 disorder, depressed (HCC)        Past Surgical History:   Procedure Laterality Date    CHOLECYSTECTOMY         Current Outpatient Medications   Medication Sig Dispense Refill    cariprazine (VRAYLAR) 3 MG capsule Take 1 capsule (3 mg total) by mouth daily 30 capsule 2    ALPRAZolam (XANAX) 0.25 mg tablet Take 0.5-1 tablets (0.125-0.25 mg total) by mouth 2 (two) times a day as needed for anxiety or sleep 60 tablet 0    levothyroxine 75 mcg tablet Take 75 mcg by mouth daily       No current facility-administered medications for this visit.        Allergies   Allergen Reactions    Penicillins Blisters       The following portions of the patient's history were reviewed and updated as appropriate: allergies, current medications, past family history, past medical history, past social history, past surgical history, and problem list.    OBJECTIVE:     Mental Status Examination:       Appearance age appropriate, casually dressed   Behavior pleasant, cooperative, calm   Speech normal volume, normal pitch   Mood Euthymic   Affect Mood congruent   Thought Processes logical   Associations intact associations   Thought Content normal   Perceptual Disturbances: none   Abnormal Thoughts  Risk Potential Suicidal ideation - None  Homicidal ideation - None  Potential for aggression - No   Orientation oriented to person, place, time/date and situation   Memory recent and remote memory grossly intact   Cosciousness alert and awake   Attention Span attention span and concentration are age appropriate   Intellect Appears to be of Average Intelligence   Insight age appropriate    Judgement good    Muscle Strength and  Gait Virtual visit, no abnormal movements seen   Language no difficulty naming common objects   Fund of Knowledge  displays adequate knowledge of current events   Pain none   Pain Scale 0        Laboratory Results: No results found for this or any previous visit.    Assessment/Plan:       Diagnoses and all orders for this visit:    Bipolar 1 disorder, depressed, partial remission (HCC)  -     cariprazine (VRAYLAR) 3 MG capsule; Take 1 capsule (3 mg total) by mouth daily    GUERDA (generalized anxiety disorder)          Treatment Recommendations- Risks Benefits      Continue the following medication:     Vraylar 3 mg daily for mood  Xanax 0.25 mg as needed take 1/2-1 twice a day for anxiety (patient uses this medication sparingly and does not request a refill at this time.)     We will follow-up in 3 months or sooner if questions or concerns arise.  She is aware of emergent versus nonemergent mental health resources.  She is able to contract for safety at this time.  She will continue with her psychotherapist within this office    Risks, Benefits And Possible Side Effects Of Medications: discussed     Controlled Medication Discussion: PDMP reviewed- no red flags  The patient understands the risk of treatment with this class of medication. They are aware of the potential for abuse. Patient agrees not to drive or operate heavy machinery if feeling impaired, to take medication as prescribed, to not drink alcohol when taking this medication, and to not share this medication with others.      Psychotherapy Provided: no    Treatment Plan:    Completed and signed during the session: Not applicable - Treatment Plan to be completed by [facility] therapist    I spent 21 minutes with the patient during this visit.      This note was completed in part utilizing Dragon dictation Software. Grammatical, translation, syntax errors, random word insertions, spelling mistakes, and incomplete sentences may be an occasional consequence of this system secondary to software limitations with voice recognition, ambient noise, and  hardware issues. If you have any questions or concerns about the content, text, or information contained within the body of this dictation, please contact the provider for clarification.     Waleska Limon PA-C 03/13/24

## 2024-03-18 ENCOUNTER — TELEPHONE (OUTPATIENT)
Dept: PSYCHIATRY | Facility: CLINIC | Age: 54
End: 2024-03-18

## 2024-03-18 NOTE — TELEPHONE ENCOUNTER
Patient is calling regarding cancelling an appointment.    Date/Time: 3/19/2024     Reason: Pt has to work but rescheduled    Patient was rescheduled: YES [x] NO []  If yes, when was Patient reschedule for: 3/26/2024    Patient requesting call back to reschedule: YES [] NO [x]

## 2024-03-26 ENCOUNTER — TELEPHONE (OUTPATIENT)
Dept: PSYCHIATRY | Facility: CLINIC | Age: 54
End: 2024-03-26

## 2024-03-26 ENCOUNTER — SOCIAL WORK (OUTPATIENT)
Dept: BEHAVIORAL/MENTAL HEALTH CLINIC | Facility: CLINIC | Age: 54
End: 2024-03-26
Payer: COMMERCIAL

## 2024-03-26 DIAGNOSIS — F31.30 BIPOLAR I DISORDER, MOST RECENT EPISODE DEPRESSED (HCC): Primary | ICD-10-CM

## 2024-03-26 PROCEDURE — 90834 PSYTX W PT 45 MINUTES: CPT

## 2024-03-26 NOTE — TELEPHONE ENCOUNTER
Patient was in office today, 3/26/24 for appointment with Zac Lopez LPC, and patient informed us to remove Justen (spouse) off of chart or getting any information about her. Updated  Medical Communication Consent and took spouse off emergency contact.

## 2024-03-26 NOTE — PSYCH
"Behavioral Health Psychotherapy Progress Note    Psychotherapy Provided: Individual Psychotherapy     1. Bipolar I disorder, most recent episode depressed (HCC)            Goals addressed in session: Goal 1     DATA: ct shared that she  from  and is moving into an aprtment with family friend who is now a boyfriend.  Ct  and adult son are angry.  Ct teenage girls are more understanding.  The boyfriend is the biological father of the youngest daughter.  Ct will have the girls part time.  Ct feels good about the move because she has been unhappy with her marriage for over a year or more.  Ct is finding support from friends and stays with a co worker when not with [place].  Ct does not feel unstable as she is being accused of by .  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    During this session, this clinician used the following therapeutic modalities: Cognitive Behavioral Therapy    Substance Abuse was not addressed during this session. If the client is diagnosed with a co-occurring substance use disorder, please indicate any changes in the frequency or amount of use: na. Stage of change for addressing substance use diagnoses: No substance use/Not applicable    ASSESSMENT:  Cecy Tao presents with a Euthymic/ normal mood.     her affect is Normal range and intensity, which is congruent, with her mood and the content of the session. The client has made progress on their goals.    Ct was tired in session because she worked an overnight Cecy Tao presents with a none risk of suicide, none risk of self-harm, and none risk of harm to others.    For any risk assessment that surpasses a \"low\" rating, a safety plan must be developed.    A safety plan was indicated: no  If yes, describe in detail na    PLAN: Between sessions, Cecy Tao will manage moods/major changes. At the next session, the therapist will use Cognitive Behavioral Therapy to address " mood disorder.    Behavioral Health Treatment Plan and Discharge Planning: Cecy Tao is aware of and agrees to continue to work on their treatment plan. They have identified and are working toward their discharge goals. yes    Visit start and stop times:    03/26/24  Start Time: 1600  Stop Time: 1645  Total Visit Time: 45 minutes

## 2024-04-01 ENCOUNTER — TELEPHONE (OUTPATIENT)
Dept: PSYCHIATRY | Facility: CLINIC | Age: 54
End: 2024-04-01

## 2024-04-01 NOTE — TELEPHONE ENCOUNTER
Patient is calling regarding cancelling an appointment.    Date/Time: 04/02/24 @ 11:00 am    Reason: Moving    Patient was rescheduled: YES [] NO [x]  If yes, when was Patient reschedule for: Next appt 04/16/24 @ 11 am    Patient requesting call back to reschedule: YES [] NO []

## 2024-04-16 ENCOUNTER — SOCIAL WORK (OUTPATIENT)
Dept: BEHAVIORAL/MENTAL HEALTH CLINIC | Facility: CLINIC | Age: 54
End: 2024-04-16
Payer: COMMERCIAL

## 2024-04-16 DIAGNOSIS — F31.30 BIPOLAR I DISORDER, MOST RECENT EPISODE DEPRESSED (HCC): Primary | ICD-10-CM

## 2024-04-16 PROCEDURE — 90834 PSYTX W PT 45 MINUTES: CPT

## 2024-04-16 NOTE — PSYCH
"Behavioral Health Psychotherapy Progress Note    Psychotherapy Provided: Individual Psychotherapy     1. Bipolar I disorder, most recent episode depressed (HCC)            Goals addressed in session: Goal 1     DATA: ct shared that she and BF have moved in together and that she is happy and content.  Ct realizes that  is hurting and has been as compassionate as she can.  They do not speak and only text.  Ct will have the girls have the week and  the other half.  Ct son and mother are not talking to her.  Ct is working a full and part time job.  Ct is encouraged to get enough sleep.  Ct daughters seem to be adjusting well.  It has only been three weeks and there have been a lot of changes.  Ct moods have been stable and sleep can improve.  During this session, this clinician used the following therapeutic modalities: Cognitive Behavioral Therapy    Substance Abuse was not addressed during this session. If the client is diagnosed with a co-occurring substance use disorder, please indicate any changes in the frequency or amount of use: na. Stage of change for addressing substance use diagnoses: No substance use/Not applicable    ASSESSMENT:  Cecy Tao presents with a Anxious mood.     her affect is Normal range and intensity, which is congruent, with her mood and the content of the session. The client has made progress on their goals.    No SI/HI Cecy Tao presents with a none risk of suicide, none risk of self-harm, and none risk of harm to others.    For any risk assessment that surpasses a \"low\" rating, a safety plan must be developed.    A safety plan was indicated: no  If yes, describe in detail na    PLAN: Between sessions, Cecy Tao will manage moods. At the next session, the therapist will use Cognitive Behavioral Therapy to address bipolar disorder.    Behavioral Health Treatment Plan and Discharge Planning: Cecy Tao is aware of and agrees to continue to work on their " treatment plan. They have identified and are working toward their discharge goals. yes    Visit start and stop times:    04/16/24  Start Time: 1050  Stop Time: 1140  Total Visit Time: 50 minutes

## 2024-04-30 ENCOUNTER — TELEPHONE (OUTPATIENT)
Dept: PSYCHIATRY | Facility: CLINIC | Age: 54
End: 2024-04-30

## 2024-04-30 NOTE — TELEPHONE ENCOUNTER
Called patient to reschedule her no show with Zac Lopez, patient apologized for forgetting and said she was not able to reschedul.

## 2024-05-14 ENCOUNTER — SOCIAL WORK (OUTPATIENT)
Dept: BEHAVIORAL/MENTAL HEALTH CLINIC | Facility: CLINIC | Age: 54
End: 2024-05-14
Payer: COMMERCIAL

## 2024-05-14 DIAGNOSIS — F31.30 BIPOLAR I DISORDER, MOST RECENT EPISODE DEPRESSED (HCC): Primary | ICD-10-CM

## 2024-05-14 PROCEDURE — 90834 PSYTX W PT 45 MINUTES: CPT

## 2024-05-14 NOTE — PSYCH
"Behavioral Health Psychotherapy Progress Note    Psychotherapy Provided: Individual Psychotherapy     1. Bipolar I disorder, most recent episode depressed (HCC)            Goals addressed in session: Goal 1     DATA: ct shared that she missed last session due to work schedule and losing track of time.  Ct reports that she was upset on Mothers Day because the girls did not stay the whole day with her and instead chose to go to fathers.  Ct and family members are still adjusting to ct moving out.  Ct  thinks she may come back.  Ct son is angry with her.  Ct is encouraged to be patient as everyone is still adjusting and it is early.  Ct BF is collecting unemployment and not looking for work.  Ct reports some intimacy issues with new BF.  Ct is working 2 jobs and will cut back once BF gets a job.    During this session, this clinician used the following therapeutic modalities: Cognitive Behavioral Therapy    Substance Abuse was not addressed during this session. If the client is diagnosed with a co-occurring substance use disorder, please indicate any changes in the frequency or amount of use: na. Stage of change for addressing substance use diagnoses: No substance use/Not applicable    ASSESSMENT:  Cecy Tao presents with a irritable mood.     her affect is Flat, which is congruent, with her mood and the content of the session. The client has made progress on their goals.    No SI/HI Cecy Tao presents with a none risk of suicide, none risk of self-harm, and none risk of harm to others.    For any risk assessment that surpasses a \"low\" rating, a safety plan must be developed.    A safety plan was indicated: no  If yes, describe in detail na    PLAN: Between sessions, Cecy Tao will manage family changes and monitor moods/sleep. At the next session, the therapist will use Cognitive Behavioral Therapy to address Bipolar Disorder.    Behavioral Health Treatment Plan and Discharge Planning: Cecy" Dinh is aware of and agrees to continue to work on their treatment plan. They have identified and are working toward their discharge goals. yes    Visit start and stop times:    05/14/24  Start Time: 1055  Stop Time: 1140  Total Visit Time: 45 minutes

## 2024-05-28 ENCOUNTER — SOCIAL WORK (OUTPATIENT)
Dept: BEHAVIORAL/MENTAL HEALTH CLINIC | Facility: CLINIC | Age: 54
End: 2024-05-28
Payer: COMMERCIAL

## 2024-05-28 DIAGNOSIS — F31.30 BIPOLAR I DISORDER, MOST RECENT EPISODE DEPRESSED (HCC): Primary | ICD-10-CM

## 2024-05-28 PROCEDURE — 90834 PSYTX W PT 45 MINUTES: CPT

## 2024-05-28 NOTE — PSYCH
Behavioral Health Psychotherapy Progress Note    Psychotherapy Provided: Individual Psychotherapy     1. Bipolar I disorder, most recent episode depressed (HCC)            Goals addressed in session: Goal 1     DATA: ct shared that she had been sick with allergic asthma and was on steroids which altered her mood somewhat.  Ct is feeling better the past couple of days.  Ct talked to mother about her brother who is in poor health.  Ct mother was angry at ct for leaving marriage.  Ct mother hung up on her and ct is hoping they can agree to disagree since brother should be the focus.  Ct son does not talk to her and is still very angry.  Ct has spoken to  who is hot and cold.  Ct youngest daughter lashed out at ct recently.  Ct is cautioned that girls are still adjusting and that it has been less then 3 months since all of these dramatic changes.  Ct has concerns about youngest daughter graduation in which her son and  will be present and ct will be with BF.  Ct also brought up abandonment issues.  Ct feels that BF will lave her and is seeking reassurance and starting suh arguments.  Ct is asked to see his actions and not his words as reassurance since he is not one to express himself.  During this session, this clinician used the following therapeutic modalities: Cognitive Behavioral Therapy    Substance Abuse was not addressed during this session. If the client is diagnosed with a co-occurring substance use disorder, please indicate any changes in the frequency or amount of use: na. Stage of change for addressing substance use diagnoses: No substance use/Not applicable    ASSESSMENT:  Cecy Tao presents with a Anxious mood.     her affect is Normal range and intensity, which is congruent, with her mood and the content of the session. The client has made progress on their goals.    No SI/HI Cecy Tao presents with a none risk of suicide, none risk of self-harm, and none risk of harm to  "others.    For any risk assessment that surpasses a \"low\" rating, a safety plan must be developed.    A safety plan was indicated: no  If yes, describe in detail na    PLAN: Between sessions, Cecy Tao will manage moods/family issues. At the next session, the therapist will use Cognitive Behavioral Therapy to address Bipolar disorder.    Behavioral Health Treatment Plan and Discharge Planning: Cecy Tao is aware of and agrees to continue to work on their treatment plan. They have identified and are working toward their discharge goals. yes    Visit start and stop times:    05/28/24  Start Time: 1055  Stop Time: 1140  Total Visit Time: 45 minutes  "

## 2024-06-10 ENCOUNTER — TELEPHONE (OUTPATIENT)
Dept: PSYCHIATRY | Facility: CLINIC | Age: 54
End: 2024-06-10

## 2024-06-10 NOTE — TELEPHONE ENCOUNTER
Patient is calling regarding cancelling an appointment.    Date/Time: 24    Reason: Brother     Patient was rescheduled: YES [] NO [x]  If yes, when was Patient reschedule for: Next appt  24 @ 11 am    Patient requesting call back to reschedule: YES [] NO []

## 2024-06-10 NOTE — TELEPHONE ENCOUNTER
Contacted patient to inform them that their appointment for 06/25/24 at 11 am was cancelled due to the provider being out of office.     Patient was rescheduled: YES [] NO [x]  If yes, when was patient rescheduled for:   If no, when is the patient's next appointment:     Patient requesting call back to reschedule: YES [] NO []

## 2024-06-14 ENCOUNTER — SOCIAL WORK (OUTPATIENT)
Dept: BEHAVIORAL/MENTAL HEALTH CLINIC | Facility: CLINIC | Age: 54
End: 2024-06-14
Payer: COMMERCIAL

## 2024-06-14 DIAGNOSIS — F31.30 BIPOLAR I DISORDER, MOST RECENT EPISODE DEPRESSED (HCC): Primary | ICD-10-CM

## 2024-06-14 PROCEDURE — 90837 PSYTX W PT 60 MINUTES: CPT

## 2024-06-14 NOTE — PSYCH
"Behavioral Health Psychotherapy Progress Note    Psychotherapy Provided: Individual Psychotherapy     1. Bipolar I disorder, most recent episode depressed (HCC)            Goals addressed in session: Goal 1     DATA: ct shared that her brother passed away last week.  Ct was with him and [facility].  Ct  was there for him and her mother who was upset with her for leaving her .  Ct and son talked a little for the first time since ct left the house.  Ct and her mother are on better terms.  Ct lost her only 2 siblings the past years.  Ct is worried that she will get sick and wants to be able to outlive her mother who is 85 and in good overall health and independent.  Ct ex  was at the  and ct shared that she has no feelings for him.  Ct boyfriend has been very supportive of her and has been respecting family by laying low for now.  Ct is off this weekend from work and needs time to recharge.  Ct cried several times in grief.    During this session, this clinician used the following therapeutic modalities: Cognitive Behavioral Therapy    Substance Abuse was not addressed during this session. If the client is diagnosed with a co-occurring substance use disorder, please indicate any changes in the frequency or amount of use: na. Stage of change for addressing substance use diagnoses: No substance use/Not applicable    ASSESSMENT:  Cecy Tao presents with a Anxious and Depressed mood.     her affect is Normal range and intensity and Tearful, which is congruent, with her mood and the content of the session. The client has made progress on their goals.    No SI/HI Cecy Tao presents with a none risk of suicide, none risk of self-harm, and none risk of harm to others.    For any risk assessment that surpasses a \"low\" rating, a safety plan must be developed.    A safety plan was indicated: no  If yes, describe in detail na    PLAN: Between sessions, Cecy Tao will manage " grief. At the next session, the therapist will use Cognitive Behavioral Therapy to address grief.    Behavioral Health Treatment Plan and Discharge Planning: Cecy Tao is aware of and agrees to continue to work on their treatment plan. They have identified and are working toward their discharge goals. yes    Visit start and stop times:    06/14/24  Start Time: 1456  Stop Time: 1550  Total Visit Time: 54 minutes

## 2024-07-08 ENCOUNTER — TELEPHONE (OUTPATIENT)
Dept: PSYCHIATRY | Facility: CLINIC | Age: 54
End: 2024-07-08

## 2024-07-09 ENCOUNTER — TELEMEDICINE (OUTPATIENT)
Dept: BEHAVIORAL/MENTAL HEALTH CLINIC | Facility: CLINIC | Age: 54
End: 2024-07-09
Payer: COMMERCIAL

## 2024-07-09 DIAGNOSIS — F31.30 BIPOLAR I DISORDER, MOST RECENT EPISODE DEPRESSED (HCC): Primary | ICD-10-CM

## 2024-07-09 PROCEDURE — 90834 PSYTX W PT 45 MINUTES: CPT

## 2024-07-09 NOTE — PSYCH
"Behavioral Health Psychotherapy Progress Note    Psychotherapy Provided: Individual Psychotherapy     1. Bipolar I disorder, most recent episode depressed (HCC)            Goals addressed in session: Goal 1     DATA: ct shared that she is doing virtual today due to working overnight last night.  Ct also reported that she was diagnosed with Arvind Bar and was placed on med's and feeling better.  Ct is talking to mother daily 2 times a day to check up on her.  Ct mother is grieving as is ct the loss of her brother.  Ct youngest daughter has been lying more often.  Ct has not spoken to son since her brothers passing.  Ct and ex are talking mainly in regards to the kids.  Ct ex still thinks she may come back.  Ct BF is collecting unemployment but not looking for work which ct would like because unemployment is not enough.  During this session, this clinician used the following therapeutic modalities: Cognitive Behavioral Therapy    This was a virtual visit.  Ct was at home and in a private setting.  My door was closed and I was alone.  Ct was verified by name and .      Substance Abuse was not addressed during this session. If the client is diagnosed with a co-occurring substance use disorder, please indicate any changes in the frequency or amount of use: na. Stage of change for addressing substance use diagnoses: No substance use/Not applicable    ASSESSMENT:  Cecy Tao presents with a Anxious mood.     her affect is Normal range and intensity, which is congruent, with her mood and the content of the session. The client has made progress on their goals.    No SI/HI Cecy Tao presents with a none risk of suicide, none risk of self-harm, and none risk of harm to others.    For any risk assessment that surpasses a \"low\" rating, a safety plan must be developed.    A safety plan was indicated: no  If yes, describe in detail na    PLAN: Between sessions, Cecy Tao will manage moods. At the next session, " the therapist will use Cognitive Behavioral Therapy to address anxiety.    Behavioral Health Treatment Plan and Discharge Planning: Cecy Tao is aware of and agrees to continue to work on their treatment plan. They have identified and are working toward their discharge goals. yes    Visit start and stop times:    07/09/24  Start Time: 1100  Stop Time: 1139  Total Visit Time: 39 minutes

## 2024-07-09 NOTE — PSYCH
Virtual Regular Visit    Verification of patient location:    Patient is located at Home in the following state in which I hold an active license [place]      Assessment/Plan:    Problem List Items Addressed This Visit    None  Visit Diagnoses       Bipolar I disorder, most recent episode depressed (HCC)    -  Primary            Goals addressed in session: Goal 1          Reason for visit is No chief complaint on file.       Encounter provider Zac Lopez LPC      Recent Visits  Date Type Provider Dept   07/08/24 Telephone Zac Lopez [facility]   Showing recent visits within past 7 days and meeting all other requirements  Today's Visits  Date Type Provider Dept   07/09/24 Telemedicine Zac Lopez [facility]   Showing today's visits and meeting all other requirements  Future Appointments  No visits were found meeting these conditions.  Showing future appointments within next 150 days and meeting all other requirements       The patient was identified by name and date of birth. Cecy Tao was informed that this is a telemedicine visit and that the visit is being conducted throughthe Epic Embedded platform. She agrees to proceed..  My office door was closed. No one else was in the room.  She acknowledged consent and understanding of privacy and security of the video platform. The patient has agreed to participate and understands they can discontinue the visit at any time.    Patient is aware this is a billable service.     Subjective  Cecy Tao is a 54 y.o. female  .      HPI     Past Medical History:   Diagnosis Date    Bipolar 1 disorder, depressed (HCC)        Past Surgical History:   Procedure Laterality Date    CHOLECYSTECTOMY         Current Outpatient Medications   Medication Sig Dispense Refill    ALPRAZolam (XANAX) 0.25 mg tablet Take 0.5-1 tablets (0.125-0.25 mg total) by mouth 2 (two) times a day as needed for anxiety or sleep 60  tablet 0    cariprazine (VRAYLAR) 3 MG capsule Take 1 capsule (3 mg total) by mouth daily 30 capsule 2    levothyroxine 75 mcg tablet Take 75 mcg by mouth daily       No current facility-administered medications for this visit.        Allergies   Allergen Reactions    Penicillins Blisters       Review of Systems    Video Exam    There were no vitals filed for this visit.    Physical Exam

## 2024-07-16 ENCOUNTER — OFFICE VISIT (OUTPATIENT)
Dept: PSYCHIATRY | Facility: CLINIC | Age: 54
End: 2024-07-16
Payer: COMMERCIAL

## 2024-07-16 DIAGNOSIS — F31.75 BIPOLAR 1 DISORDER, DEPRESSED, PARTIAL REMISSION (HCC): Primary | ICD-10-CM

## 2024-07-16 DIAGNOSIS — F41.1 GAD (GENERALIZED ANXIETY DISORDER): ICD-10-CM

## 2024-07-16 PROCEDURE — 99214 OFFICE O/P EST MOD 30 MIN: CPT | Performed by: PHYSICIAN ASSISTANT

## 2024-07-16 RX ORDER — ALBUTEROL SULFATE 90 UG/1
AEROSOL, METERED RESPIRATORY (INHALATION)
COMMUNITY
Start: 2024-07-02

## 2024-07-16 RX ORDER — PREDNISONE 10 MG/1
TABLET ORAL
COMMUNITY
Start: 2024-05-21

## 2024-07-16 RX ORDER — ERGOCALCIFEROL 1.25 MG/1
CAPSULE ORAL
COMMUNITY
Start: 2024-07-03

## 2024-07-16 NOTE — PSYCH
"This note was not shared with the patient due to reasonable likelihood of causing patient harm     PROGRESS NOTE        [facility]      Name and Date of Birth:  Cecy Tao 54 y.o. 1970    Date of Visit: 07/16/24    SUBJECTIVE:    Cecy presents today for medication management and follow-up.  She reports that she has been in a relationship with her daughter's biological father for the last 6 months.  She ended up moving out of of her home with her .  She reports that her relationship with her daughters \"is going well.\"  She notes that she is having conflict with her son based upon her decision and he is not speaking with her.  She describes her mood as good and stable.  She does have some anxiety about the unknown.  She has been going through some things with her health.  She was found to be prediabetic.  She has started a low-carb diet.  She was also found to be deficient in vitamin D and B12 which she is taking supplementation for now.  She is still grieving her brother who passed away about a month ago.    Today she does mention \"shakes\" in her bilateral hands.  She reports that she feels she has some sort of control over this and can stop it when it occurs.  She shares that this has been going on for years.  She will discuss with her primary and then we will determine if we feel its medication related.    She denies any significant depressive or anxiety symptoms.  She denies any symptoms of dale.  She has been sleeping and getting adequate nutrition.  She has been taking her medication consistently and she would like to remain on same.      She denies suicidal ideation, intent or plan at present, has no suicidal ideation, intent or plan at present.    She denies any auditory hallucinations and visual hallucinations, denies any other delusional thinking, denies any delusional thinking.    She denies any side effects from medications  .  HPI ROS " Appetite Changes and Sleep: normal appetite, normal sleep    Review Of Systems:      Constitutional Negative   ENT Negative   Cardiovascular Negative   Respiratory Negative   Gastrointestinal Negative   Genitourinary Negative   Musculoskeletal Negative   Integumentary Negative   Neurological Negative   Endocrine Negative   Other Symptoms Negative and None       Laboratory Results: No results found for this or any previous visit.    Substance Abuse History:    Social History     Substance and Sexual Activity   Drug Use Not on file       Family Psychiatric History:     History reviewed. No pertinent family history.    The following portions of the patient's history were reviewed and updated as appropriate: past family history, past medical history, past social history, past surgical history and problem list.    Social History     Socioeconomic History    Marital status: /Civil Union     Spouse name: Not on file    Number of children: Not on file    Years of education: Not on file    Highest education level: Not on file   Occupational History    Not on file   Tobacco Use    Smoking status: Former    Smokeless tobacco: Never   Substance and Sexual Activity    Alcohol use: Not on file    Drug use: Not on file    Sexual activity: Not on file   Other Topics Concern    Not on file   Social History Narrative    Not on file     Social Determinants of Health     Financial Resource Strain: Not on file   Food Insecurity: Not on file   Transportation Needs: Not on file   Physical Activity: Not on file   Stress: Not on file   Social Connections: Not on file   Intimate Partner Violence: Not on file   Housing Stability: Not on file     Social History     Social History Narrative    Not on file        Social History       Tobacco History       Smoking Status  Former      Smokeless Tobacco Use  Never              Alcohol History       Alcohol Use Status  Not Asked              Drug Use       Drug Use Status  Not Asked               Sexual Activity       Sexually Active  Not Asked              Activities of Daily Living    Not Asked                       OBJECTIVE:     Mental Status Evaluation:    Appearance age appropriate, casually dressed   Behavior pleasant, cooperative   Speech normal volume, normal pitch   Mood Euthymic   Affect Mood congruent   Thought Processes logical   Associations intact associations   Thought Content normal   Perceptual Disturbances: none   Abnormal Thoughts  Risk Potential Suicidal ideation - None  Homicidal ideation - None  Potential for aggression - No   Orientation oriented to person, place, time/date and situation   Memory recent and remote memory grossly intact   Cosciousness alert and awake   Attention Span attention span and concentration are age appropriate   Intellect Appears to be of Average Intelligence   Insight age appropriate    Judgement good    Muscle Strength and  Gait muscle strength and tone were normal   Language no difficulty naming common objects   Fund of Knowledge displays adequate knowledge of current events   Pain none   Pain Scale 0       Assessment/Plan:       Diagnoses and all orders for this visit:    Bipolar 1 disorder, depressed, partial remission (HCC)  -     cariprazine (VRAYLAR) 3 MG capsule; Take 1 capsule (3 mg total) by mouth daily    GUERDA (generalized anxiety disorder)    Other orders  -     albuterol (PROVENTIL HFA,VENTOLIN HFA) 90 mcg/act inhaler; TAKE 2 PUFFS BY MOUTH EVERY 4 TO 6 HOURS AS NEEDED  -     ergocalciferol (VITAMIN D2) 50,000 units; take 1 capsule by mouth one time per week  -     predniSONE 10 mg tablet; TAKE 5 TABLETS BY ORAL ROUTE ON DAY 1, 4 TABLETS ON DAY 2, 3 ON DAY 3, 2 ON DAY 4, 1 ON DAY 5.          Treatment Recommendations/Precautions:    Continue the following medication:    Vraylar 3 mg daily for mood  Xanax 0.25 mg as needed take 1/2-1 twice a day for anxiety (patient uses this medication sparingly and does not request a refill at this time.)      We will follow-up in 3 months or sooner if questions or concerns arise.  She is aware of emergent versus nonemergent mental health resources.  She is able to contract for safety at this time.  She will continue with her psychotherapist within this office    Start time: 8:00 AM  End Time: 8:21 AM     Total visit time: 21 minutes    Risks/Benefits      Risks, Benefits And Possible Side Effects Of Medications:    Risks, benefits, and possible side effects of medications explained to patient and patient verbalizes understanding and agreement for treatment.    Controlled Medication Discussion:      reviewed-no red flags  The patient understands the risk of treatment with this class of medication. They are aware of the potential for abuse. Patient agrees not to drive or operate heavy machinery if feeling impaired, to take medication as prescribed, to not drink alcohol when taking this medication, and to not share this medication with others.      Psychotherapy Provided:     Individual psychotherapy provided: No    This note was completed in part utilizing Dragon dictation Software. Grammatical, translation, syntax errors, random word insertions, spelling mistakes, and incomplete sentences may be an occasional consequence of this system secondary to software limitations with voice recognition, ambient noise, and hardware issues. If you have any questions or concerns about the content, text, or information contained within the body of this dictation, please contact the provider for clarification.

## 2024-07-17 ENCOUNTER — TELEPHONE (OUTPATIENT)
Dept: PSYCHIATRY | Facility: CLINIC | Age: 54
End: 2024-07-17

## 2024-07-17 NOTE — TELEPHONE ENCOUNTER
Called insurance--Verified New Insurance by phone call-Reference # [id number]/11/2024  Precert is needed after 13 visits per calendar year--telephone # [phone]  $0 outpocket/$0 deductible  $50.00 copay  Claims to [employer]    Called patient to let her know.

## 2024-07-23 ENCOUNTER — SOCIAL WORK (OUTPATIENT)
Dept: BEHAVIORAL/MENTAL HEALTH CLINIC | Facility: CLINIC | Age: 54
End: 2024-07-23
Payer: COMMERCIAL

## 2024-07-23 DIAGNOSIS — F31.30 BIPOLAR I DISORDER, MOST RECENT EPISODE DEPRESSED (HCC): Primary | ICD-10-CM

## 2024-07-23 PROCEDURE — 90834 PSYTX W PT 45 MINUTES: CPT

## 2024-07-23 NOTE — BH TREATMENT PLAN
Outpatient Behavioral Health Psychotherapy Treatment Plan    Cecy Tao  1970     Date of Initial Psychotherapy Assessment: 9/2/2021   Date of Current Treatment Plan: 07/23/24  Treatment Plan Target Date: 1/23/2025  Treatment Plan Expiration Date: 1/23/2025    Diagnosis:   1. Bipolar I disorder, most recent episode depressed (HCC)            Area(s) of Need: Mood    Long Term Goal 1 (in the client's own words): I want to work on improving relationships with children and BF    Stage of Change: Action    Target Date for completion: 1/23/2025     Anticipated therapeutic modalities: CBT     People identified to complete this goal: client and counselor      Objective 1: (identify the means of measuring success in meeting the objective): ct will continue to have regular interaction with children      Objective 2: (identify the means of measuring success in meeting the objective): ct will make time for BF and to continue to strengthen relationship         I am currently under the care of a [facility] psychiatric provider: yes    My [facility] psychiatric provider is: Waleska Limon    I am currently taking psychiatric medications: Yes, as prescribed    I feel that I will be ready for discharge from mental health care when I reach the following (measurable goal/objective): When things are calm    For children and adults who have a legal guardian:   Has there been any change to custody orders and/or guardianship status? No. If yes, attach updated documentation.    I have created my Crisis Plan and have been offered a copy of this plan    Behavioral Health Treatment Plan  Luke: Diagnosis and Treatment Plan explained to Cecy Tao acknowledges an understanding of their diagnosis. Cecy Tao agrees to this treatment plan.    I have been offered a copy of this Treatment Plan. yes

## 2024-07-23 NOTE — PSYCH
"Behavioral Health Psychotherapy Progress Note    Psychotherapy Provided: Individual Psychotherapy     1. Bipolar I disorder, most recent episode depressed (HCC)            Goals addressed in session: Goal 1     DATA: ct shared that things are difficult with estranged  as he is going through emotions and being very mean to ct.  Ct is encouraged to use boundaries and try to keep topic to the children.  Ct oldest son is not talking to her.  Ct was reminded that it may take years for things to level out with her son.  Ct is doing well with the girls and sees them every other week.  Ct girls seem to be doing well at either place.  Ct will talk to friend about how to start the divorce process when doing it on your own.  During this session, this clinician used the following therapeutic modalities: Cognitive Behavioral Therapy    Substance Abuse was not addressed during this session. If the client is diagnosed with a co-occurring substance use disorder, please indicate any changes in the frequency or amount of use: na. Stage of change for addressing substance use diagnoses: No substance use/Not applicable    ASSESSMENT:  Cecy Tao presents with a Anxious mood.     her affect is Normal range and intensity, which is congruent, with her mood and the content of the session. The client has made progress on their goals.    No SI/HI Cecy Tao presents with a none risk of suicide, none risk of self-harm, and none risk of harm to others.    For any risk assessment that surpasses a \"low\" rating, a safety plan must be developed.    A safety plan was indicated: no  If yes, describe in detail na    PLAN: Between sessions, Cecy Tao will manage moods/family relationships. At the next session, the therapist will use Cognitive Behavioral Therapy to address mood and family issues.    Behavioral Health Treatment Plan and Discharge Planning: Cecy Tao is aware of and agrees to continue to work on their treatment " plan. They have identified and are working toward their discharge goals. yes    Visit start and stop times:    07/23/24

## 2024-09-02 ENCOUNTER — TELEPHONE (OUTPATIENT)
Dept: OTHER | Facility: OTHER | Age: 54
End: 2024-09-02

## 2024-09-02 NOTE — TELEPHONE ENCOUNTER
Patient is calling regarding cancelling an appointment.    Date/Time:9/3/2024 @ 11am    Patient was rescheduled: YES [] NO [x]    Patient requesting call back to reschedule: YES [x] NO []

## 2024-09-03 ENCOUNTER — SOCIAL WORK (OUTPATIENT)
Dept: BEHAVIORAL/MENTAL HEALTH CLINIC | Facility: CLINIC | Age: 54
End: 2024-09-03
Payer: COMMERCIAL

## 2024-09-03 DIAGNOSIS — F31.30 BIPOLAR I DISORDER, MOST RECENT EPISODE DEPRESSED (HCC): Primary | ICD-10-CM

## 2024-09-03 PROCEDURE — 90834 PSYTX W PT 45 MINUTES: CPT

## 2024-09-03 NOTE — PSYCH
"Behavioral Health Psychotherapy Progress Note    Psychotherapy Provided: Individual Psychotherapy     1. Bipolar I disorder, most recent episode depressed (HCC)            Goals addressed in session: Goal 1     DATA: ct shared that she and Bf are engaged and that estranged  and son does not know.  Ct girls know and ct is not worried that they will tell there father.  Stefanie martinez had a recent relapse.  Ct was unhappy and michelle is committed to not using however he does not address his issues with counseling or self help.  Stefanie martinez did get a job and that is taking pressure off of ct to pay all the bills.  Ct has been in touch with mother who has been more supportive of ct.  Ct reports stable mood and good sleep.  During this session, this clinician used the following therapeutic modalities: Cognitive Behavioral Therapy    Substance Abuse was not addressed during this session. If the client is diagnosed with a co-occurring substance use disorder, please indicate any changes in the frequency or amount of use: na. Stage of change for addressing substance use diagnoses: Preparation    ASSESSMENT:  Cecy Tao presents with a anxious mood.     her affect is Normal range and intensity, which is congruent, with her mood and the content of the session. The client has made progress on their goals.    No SI/HI Cecy Tao presents with a none risk of suicide, none risk of self-harm, and none risk of harm to others.    For any risk assessment that surpasses a \"low\" rating, a safety plan must be developed.    A safety plan was indicated: no  If yes, describe in detail na    PLAN: Between sessions, Cecy Tao will manage moods and anxiety. At the next session, the therapist will use Cognitive Behavioral Therapy to address mood instability.    Behavioral Health Treatment Plan and Discharge Planning: Cecy Tao is aware of and agrees to continue to work on their treatment plan. They have identified and are " working toward their discharge goals. yes    Visit start and stop times:    09/03/24  Start Time: 1033  Stop Time: 1113  Total Visit Time: 40 minutes   Home

## 2024-10-01 ENCOUNTER — SOCIAL WORK (OUTPATIENT)
Dept: BEHAVIORAL/MENTAL HEALTH CLINIC | Facility: CLINIC | Age: 54
End: 2024-10-01
Payer: COMMERCIAL

## 2024-10-01 DIAGNOSIS — F31.30 BIPOLAR I DISORDER, MOST RECENT EPISODE DEPRESSED (HCC): Primary | ICD-10-CM

## 2024-10-01 PROCEDURE — 90837 PSYTX W PT 60 MINUTES: CPT

## 2024-10-01 NOTE — PSYCH
"Behavioral Health Psychotherapy Progress Note    Psychotherapy Provided: Individual Psychotherapy     1. Bipolar I disorder, most recent episode depressed (HCC)            Goals addressed in session: Goal 1     DATA: ct shared that she has been getting easily frustrated with her 2 girls.  They both seem to be pushing boundaries and have an attitude.  Ct is happy at times when they both go back to there fathers.ct switched her jobs up again.  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  Ct likes being closer and is making more money and doing less hours at the group home.  Ct BF working makes a big difference also as he is now contributing to household expenses.  Ct estranged  is telling the girls negative things about ct and her BF.  Ct did mention that paperwork for the divorce has started.  Ct oldest son is till not talking to her.  Ct will continue to reach out.  During this session, this clinician used the following therapeutic modalities: Cognitive Behavioral Therapy    Substance Abuse was not addressed during this session. If the client is diagnosed with a co-occurring substance use disorder, please indicate any changes in the frequency or amount of use: na. Stage of change for addressing substance use diagnoses: No substance use/Not applicable    ASSESSMENT:  Cecy Tao presents with a Anxious and irritable  mood.     her affect is Normal range and intensity, which is congruent, with her mood and the content of the session. The client has made progress on their goals.    No SI/HI Cecy Tao presents with a none risk of suicide, none risk of self-harm, and none risk of harm to others.    For any risk assessment that surpasses a \"low\" rating, a safety plan must be developed.    A safety plan was indicated: no  If yes, describe in detail na    PLAN: Between sessions, Cecy Tao will manage moods. At the next session, the therapist will use Cognitive Behavioral Therapy " to address Mood disorder.    Behavioral Health Treatment Plan and Discharge Planning: Cecy Tao is aware of and agrees to continue to work on their treatment plan. They have identified and are working toward their discharge goals. yes    Visit start and stop times:    10/01/24  Start Time: 1052  Stop Time: 1147  Total Visit Time: 55 minutes

## 2024-10-23 ENCOUNTER — TELEPHONE (OUTPATIENT)
Age: 54
End: 2024-10-23

## 2024-10-23 NOTE — TELEPHONE ENCOUNTER
Patient called In to schedule 3 month f/u visit.     Patient is now scheduled for 11/8 @1pm in office.

## 2024-10-23 NOTE — TELEPHONE ENCOUNTER
Patient is calling regarding cancelling an appointment.    Date/Time: 11/26 @11am in office    Reason: conflicting schedule/ work     Patient was rescheduled: YES [] NO [x]  If yes, when was Patient reschedule for: Patient has recurring appts     Patient requesting call back to reschedule: YES [] NO [x]    Patient inquiring about r/s all recurring appts after 12:30pm because of work.    Writer tried so scheduled, but nothing was available.     Writer informed patient msg will be relay to provider and office staff, and someone will contact her.

## 2024-11-01 DIAGNOSIS — F31.75 BIPOLAR 1 DISORDER, DEPRESSED, PARTIAL REMISSION (HCC): ICD-10-CM

## 2024-11-01 RX ORDER — CARIPRAZINE 3 MG/1
3 CAPSULE, GELATIN COATED ORAL DAILY
Qty: 30 CAPSULE | Refills: 2 | Status: SHIPPED | OUTPATIENT
Start: 2024-11-01

## 2024-11-08 ENCOUNTER — OFFICE VISIT (OUTPATIENT)
Dept: PSYCHIATRY | Facility: CLINIC | Age: 54
End: 2024-11-08
Payer: COMMERCIAL

## 2024-11-08 DIAGNOSIS — F41.1 GAD (GENERALIZED ANXIETY DISORDER): ICD-10-CM

## 2024-11-08 DIAGNOSIS — F31.9 BIPOLAR 1 DISORDER (HCC): Primary | ICD-10-CM

## 2024-11-08 PROCEDURE — 99214 OFFICE O/P EST MOD 30 MIN: CPT | Performed by: PHYSICIAN ASSISTANT

## 2024-11-08 RX ORDER — MELOXICAM 7.5 MG/1
1 TABLET ORAL DAILY
COMMUNITY
Start: 2024-10-29

## 2024-11-08 RX ORDER — LEVOTHYROXINE SODIUM 100 UG/1
1 TABLET ORAL DAILY
COMMUNITY
Start: 2024-11-04

## 2024-11-10 PROBLEM — F41.1 GAD (GENERALIZED ANXIETY DISORDER): Status: ACTIVE | Noted: 2024-11-10

## 2024-11-11 NOTE — PSYCH
This note was not shared with the patient due to reasonable likelihood of causing patient harm     PROGRESS NOTE        [facility]      Name and Date of Birth:  Cecy Tao 54 y.o. 1970    Date of Visit: 11/8/24      SUBJECTIVE:    Cecy presents today for medication management and follow-up.  She shares that she is still pending divorce but she is recently engaged to her biological daughter's father.  She shares that the girls come to visit her place from Sunday to Wednesday.  Her son is still not speaking with her.  She does reports that she gets sad about this often.  She has waiting for it to pass.  She denies any symptoms of dale.  She reports that she quit her second job and now is just picking up more hours at her current job.      She has been sleeping well and getting adequate nutrition.    She denies suicidal ideation, intent or plan at present, has no suicidal ideation, intent or plan at present.    She denies any auditory hallucinations and visual hallucinations, denies any other delusional thinking, denies any delusional thinking.    She denies any side effects from medications  .  HPI ROS Appetite Changes and Sleep: normal appetite, normal sleep    Review Of Systems:      Constitutional Negative   ENT Negative   Cardiovascular Negative   Respiratory Negative   Gastrointestinal Negative   Genitourinary Negative   Musculoskeletal Negative   Integumentary Negative   Neurological Negative   Endocrine Negative   Other Symptoms Negative and None       Laboratory Results: No results found for this or any previous visit.    Substance Abuse History:    Social History     Substance and Sexual Activity   Drug Use Not on file       Family Psychiatric History:     History reviewed. No pertinent family history.    The following portions of the patient's history were reviewed and updated as appropriate: past family history, past medical history, past social  history, past surgical history and problem list.    Social History     Socioeconomic History    Marital status: /Civil Union     Spouse name: Not on file    Number of children: Not on file    Years of education: Not on file    Highest education level: Not on file   Occupational History    Not on file   Tobacco Use    Smoking status: Former    Smokeless tobacco: Never   Substance and Sexual Activity    Alcohol use: Not on file    Drug use: Not on file    Sexual activity: Not on file   Other Topics Concern    Not on file   Social History Narrative    Not on file     Social Determinants of Health     Financial Resource Strain: Not on file   Food Insecurity: Not on file   Transportation Needs: Not on file   Physical Activity: Not on file   Stress: Not on file   Social Connections: Not on file   Intimate Partner Violence: Not on file   Housing Stability: Not on file     Social History     Social History Narrative    Not on file        Social History       Tobacco History       Smoking Status  Former      Smokeless Tobacco Use  Never              Alcohol History       Alcohol Use Status  Not Asked              Drug Use       Drug Use Status  Not Asked              Sexual Activity       Sexually Active  Not Asked              Activities of Daily Living    Not Asked                       OBJECTIVE:     Mental Status Evaluation:    Appearance age appropriate, casually dressed   Behavior pleasant, cooperative, talkative    Speech normal volume, normal pitch   Mood Euthymic   Affect Mood congruent   Thought Processes logical   Associations intact associations   Thought Content normal   Perceptual Disturbances: none   Abnormal Thoughts  Risk Potential Suicidal ideation - None  Homicidal ideation - None  Potential for aggression - No   Orientation oriented to person, place, time/date and situation   Memory recent and remote memory grossly intact   Cosciousness alert and awake   Attention Span attention span and  concentration are age appropriate   Intellect Appears to be of Average Intelligence   Insight age appropriate    Judgement good    Muscle Strength and  Gait muscle strength and tone were normal   Language no difficulty naming common objects   Fund of Knowledge displays adequate knowledge of current events   Pain none   Pain Scale 0       Assessment/Plan:     Assessment & Plan  Bipolar 1 disorder (HCC)  Continue Vraylar 3 mg daily for mood   continue psychotherapy           GUERDA (generalized anxiety disorder)  C bipolar disorder              Treatment Recommendations/Precautions:    Continue the following medication:    Vraylar 3 mg daily for mood       We will follow-up in 3 months or sooner if questions or concerns arise.  She is aware of emergent versus nonemergent mental health resources.  She is able to contract for safety at this time.  She will continue with her psychotherapist within this office    Start time: 1:00 PM  End Time: 1:25 PM    Total visit time: 25 minutes    Risks/Benefits      Risks, Benefits And Possible Side Effects Of Medications:    Risks, benefits, and possible side effects of medications explained to patient and patient verbalizes understanding and agreement for treatment.    Controlled Medication Discussion:      reviewed-no red flags  The patient understands the risk of treatment with this class of medication. They are aware of the potential for abuse. Patient agrees not to drive or operate heavy machinery if feeling impaired, to take medication as prescribed, to not drink alcohol when taking this medication, and to not share this medication with others.      Psychotherapy Provided:     Individual psychotherapy provided: No    This note was completed in part utilizing Dragon dictation Software. Grammatical, translation, syntax errors, random word insertions, spelling mistakes, and incomplete sentences may be an occasional consequence of this system secondary to software limitations  with voice recognition, ambient noise, and hardware issues. If you have any questions or concerns about the content, text, or information contained within the body of this dictation, please contact the provider for clarification.

## 2025-02-04 ENCOUNTER — OFFICE VISIT (OUTPATIENT)
Dept: PSYCHIATRY | Facility: CLINIC | Age: 55
End: 2025-02-04
Payer: COMMERCIAL

## 2025-02-04 DIAGNOSIS — F31.9 BIPOLAR 1 DISORDER (HCC): Primary | ICD-10-CM

## 2025-02-04 DIAGNOSIS — F41.1 GAD (GENERALIZED ANXIETY DISORDER): ICD-10-CM

## 2025-02-04 PROCEDURE — 99214 OFFICE O/P EST MOD 30 MIN: CPT | Performed by: PHYSICIAN ASSISTANT

## 2025-02-04 RX ORDER — PROPRANOLOL HYDROCHLORIDE 10 MG/1
10 TABLET ORAL 2 TIMES DAILY
Qty: 60 TABLET | Refills: 1 | Status: SHIPPED | OUTPATIENT
Start: 2025-02-04 | End: 2025-02-10

## 2025-02-04 NOTE — PSYCH
This note was not shared with the patient due to reasonable likelihood of causing patient harm     PROGRESS NOTE        [facility]      Name and Date of Birth:  Cecy Tao 54 y.o. 1970    Date of Visit: 02/04/25       SUBJECTIVE:    Cecy presents today for medication management and follow-up.  She reports feeling anxious today because her daughter has to go to the emergency department to be evaluated due to unsafe thoughts.  She shares that her shaking in her hands has worsened and today she notes having movements on her feet and tongue.  She feels these have become more constant recently.  She notes that this increases with anxiety.    She has been sleeping and getting adequate nutrition.    Discussed that this provider may ask for a consult for second opinion on these movements.  She is currently presenting with stable mood on her current regimen.      She denies suicidal ideation, intent or plan at present, has no suicidal ideation, intent or plan at present.    She denies any auditory hallucinations and visual hallucinations, denies any other delusional thinking, denies any delusional thinking.    She denies any side effects from medications  .  HPI ROS Appetite Changes and Sleep: normal appetite, normal sleep    Review Of Systems:      Constitutional Negative   ENT Negative   Cardiovascular Negative   Respiratory Negative   Gastrointestinal Negative   Genitourinary Negative   Musculoskeletal Negative   Integumentary Negative   Neurological Negative   Endocrine Negative   Other Symptoms Negative and None       Laboratory Results: No results found for this or any previous visit.    Substance Abuse History:    Social History     Substance and Sexual Activity   Drug Use Not on file       Family Psychiatric History:     History reviewed. No pertinent family history.    The following portions of the patient's history were reviewed and updated as  appropriate: past family history, past medical history, past social history, past surgical history and problem list.    Social History     Socioeconomic History    Marital status: /Civil Union     Spouse name: Not on file    Number of children: Not on file    Years of education: Not on file    Highest education level: Not on file   Occupational History    Not on file   Tobacco Use    Smoking status: Former    Smokeless tobacco: Never   Substance and Sexual Activity    Alcohol use: Not on file    Drug use: Not on file    Sexual activity: Not on file   Other Topics Concern    Not on file   Social History Narrative    Not on file     Social Drivers of Health     Financial Resource Strain: Not on file   Food Insecurity: Not on file   Transportation Needs: Not on file   Physical Activity: Not on file   Stress: Not on file   Social Connections: Not on file   Intimate Partner Violence: Not on file   Housing Stability: Not on file     Social History     Social History Narrative    Not on file        Social History       Tobacco History       Smoking Status  Former      Smokeless Tobacco Use  Never              Alcohol History       Alcohol Use Status  Not Asked              Drug Use       Drug Use Status  Not Asked              Sexual Activity       Sexually Active  Not Asked              Activities of Daily Living    Not Asked                       OBJECTIVE:     Mental Status Evaluation:    Appearance age appropriate, casually dressed   Behavior pleasant, cooperative, talkative    Speech normal volume, normal pitch   Mood Euthymic   Affect Mood congruent   Thought Processes logical   Associations intact associations   Thought Content normal   Perceptual Disturbances: none   Abnormal Thoughts  Risk Potential Suicidal ideation - None  Homicidal ideation - None  Potential for aggression - No   Orientation oriented to person, place, time/date and situation   Memory recent and remote memory grossly intact   Cosciousness  "alert and awake   Attention Span attention span and concentration are age appropriate   Intellect Appears to be of Average Intelligence   Insight age appropriate    Judgement good    Muscle Strength and  Gait Shaking to bilateral hands noted intermittently, decreases with intention, tongue movements not noted during this encounter   Language no difficulty naming common objects   Fund of Knowledge displays adequate knowledge of current events   Pain none   Pain Scale 0       Assessment/Plan:     Assessment & Plan  Bipolar 1 disorder (HCC)  Continue Vraylar 3 mg daily  Will start propranolol 10 mg twice daily for anxiety as well as abnormal movements.         GUERDA (generalized anxiety disorder)  see bipolar    Orders:    propranolol (INDERAL) 10 mg tablet; Take 1 tablet (10 mg total) by mouth 2 (two) times a day         Treatment Recommendations/Precautions:    Patient noted to have intermittent bilateral repetitive shaking hands. Improves with intention. She is reporting that this has been occurring for \"years.\"  Discussed consult for second opinion.  Discussed that propranolol may be helpful for anxiety as well as these movements.  Discussed hypotensive symptoms.  Will start propranolol 10 mg twice daily for anxiety.    Continue the following medication:    Vraylar 3 mg daily for mood       We will follow-up in 3 months or sooner if questions or concerns arise.  She is aware of emergent versus nonemergent mental health resources.  She is able to contract for safety at this time.  She will continue with her psychotherapist within this office    Start time: 9:00AM  End Time: 9:20 AM    Total visit time: 20 minutes    Risks/Benefits      Risks, Benefits And Possible Side Effects Of Medications:    Risks, benefits, and possible side effects of medications explained to patient and patient verbalizes understanding and agreement for treatment.    Controlled Medication Discussion:      reviewed-no red flags  The patient " understands the risk of treatment with this class of medication. They are aware of the potential for abuse. Patient agrees not to drive or operate heavy machinery if feeling impaired, to take medication as prescribed, to not drink alcohol when taking this medication, and to not share this medication with others.      Psychotherapy Provided:     Individual psychotherapy provided: No    This note was completed in part utilizing Dragon dictation Software. Grammatical, translation, syntax errors, random word insertions, spelling mistakes, and incomplete sentences may be an occasional consequence of this system secondary to software limitations with voice recognition, ambient noise, and hardware issues. If you have any questions or concerns about the content, text, or information contained within the body of this dictation, please contact the provider for clarification.

## 2025-02-05 NOTE — ASSESSMENT & PLAN NOTE
Continue Vraylar 3 mg daily  Will start propranolol 10 mg twice daily for anxiety as well as abnormal movements.

## 2025-02-07 ENCOUNTER — TELEPHONE (OUTPATIENT)
Age: 55
End: 2025-02-07

## 2025-02-07 NOTE — TELEPHONE ENCOUNTER
Patient called in regarding the following medication:      propranolol (INDERAL) 10 mg tablet     Patient stated that the medication is not effective, and would like to discuss this with the provider.    Please contact the patient at:    [phone]     Thank you.

## 2025-02-10 DIAGNOSIS — F41.1 GAD (GENERALIZED ANXIETY DISORDER): Primary | ICD-10-CM

## 2025-02-10 RX ORDER — BUSPIRONE HYDROCHLORIDE 15 MG/1
15 TABLET ORAL 2 TIMES DAILY
Qty: 60 TABLET | Refills: 2 | Status: SHIPPED | OUTPATIENT
Start: 2025-02-10 | End: 2025-05-11

## 2025-02-10 NOTE — PROGRESS NOTES
Spoke with patient who had called reporting that she did not want to continue with the propranolol as she did not find it helpful.  She was on BuSpar in the past and prefers to go back on BuSpar.  Discussed indication, potential side effects, and benefits of BuSpar.  Will start BuSpar 15 mg twice a day for anxiety.    Patient does have a consult coming up with Dr. Dean.  She is being referred for consult due to evaluation of possible tardive dyskinesia versus essential/familial tremor.

## 2025-02-17 DIAGNOSIS — F31.75 BIPOLAR 1 DISORDER, DEPRESSED, PARTIAL REMISSION (HCC): ICD-10-CM

## 2025-02-17 NOTE — TELEPHONE ENCOUNTER
Reason for call:   [x] Refill   [] Prior Auth  [] Other:     Office:   [] PCP/Provider -   [x] Specialty/Provider - Waleska Limon PA-C     Medication:   Vraylar 3 MG capsule     Dose/Frequency: 3 g    Quantity: 30    Pharmacy: [facility]/pharmacy #[id number] [address]     Does the patient have enough for 3 days?   [] Yes   [x] No - Send as HP to POD

## 2025-02-24 ENCOUNTER — TELEMEDICINE (OUTPATIENT)
Dept: PSYCHIATRY | Facility: CLINIC | Age: 55
End: 2025-02-24
Payer: COMMERCIAL

## 2025-02-24 ENCOUNTER — TELEPHONE (OUTPATIENT)
Age: 55
End: 2025-02-24

## 2025-02-24 DIAGNOSIS — G24.01 TARDIVE DYSKINESIA: ICD-10-CM

## 2025-02-24 DIAGNOSIS — F41.1 GAD (GENERALIZED ANXIETY DISORDER): ICD-10-CM

## 2025-02-24 DIAGNOSIS — F31.75 BIPOLAR 1 DISORDER, DEPRESSED, PARTIAL REMISSION (HCC): Primary | ICD-10-CM

## 2025-02-24 PROCEDURE — 99214 OFFICE O/P EST MOD 30 MIN: CPT | Performed by: STUDENT IN AN ORGANIZED HEALTH CARE EDUCATION/TRAINING PROGRAM

## 2025-02-24 PROCEDURE — 90833 PSYTX W PT W E/M 30 MIN: CPT | Performed by: STUDENT IN AN ORGANIZED HEALTH CARE EDUCATION/TRAINING PROGRAM

## 2025-02-24 NOTE — PSYCH
Virtual Regular Visit    Patient is located at Other in the following state in which I hold an active license [place]    Reason for visit is   Chief Complaint   Patient presents with    Medication Management        Visit Time  Visit Start Time: 8:05 AM  Visit Stop Time: 9:05 AM  Total Visit Duration: 60 minutes    Encounter provider: Eh Dean MD    Provider located in [place]    Recent Visits  No visits were found meeting these conditions.  Showing recent visits within past 7 days and meeting all other requirements  Today's Visits  Date Type Provider Dept   02/24/25 Telemedicine Eh Dean MD  [facility]   Showing today's visits and meeting all other requirements  Future Appointments  No visits were found meeting these conditions.  Showing future appointments within next 150 days and meeting all other requirements       The patient was identified by name and date of birth. Cecy Tao was informed that this is a telemedicine visit and that the visit is being conducted through the Epic Embedded platform. She agrees to proceed. My office door was closed. No one else was in the room. She acknowledged consent and understanding of privacy and security of the video platform. The patient has agreed to participate and understands they can discontinue the visit at any time. Patient is aware this is a billable service.     Name and Date of Birth:  Cecy Tao 54 y.o. 1970 MRN: [id number]    Date of Visit: February 24, 2025    Subjective    The patient was visited virtually for Medication Management. Presented calm, and cooperative. Reported feeling overall well, though frustrated regarding medication side effect. She sees Waleska Limon PA-C at the [facility] office and has had concerns about Tardive Dyskinesia, seen today as a consultation to evaluate these symptoms .    The patient presents as pleasant and cooperative throughout encounter.  She states  that she has been undergoing mental health treatment for many years and had gone to the emergency department approximately 10 years ago due to family related stressors.  She states that at that time, she had been fighting with her daughter and was having marital conflict.  The patient reports that during that time, she had been having severe mood dysregulation and was not acting like herself and not sleeping.  She notes that she was also feeling depressed at the same time with substantial anger and feelings of self-loathing.  At that time, child protective services was contacted due to the patient having expressed wanting to hurt her daughter.  She states that she did present to the emergency department as above and was able to be referred for an intensive outpatient program, which she completed and then resumed her care at [facility] on a regular outpatient basis.  At the time, she was started on Lamictal and Abilify.  She states that she had an adverse skin reaction while on Lamictal that would cause severe rashes do occur when she was exposed to the sun and also started having problems with poorly controlled weight gain on Abilify.  She had been discontinued from Lamictal, though a few years ago was switched from Abilify to Vraylar.  The patient reports that she has had substantial improvement in her mood stability since being on medications and has had significantly improved tolerability on Vraylar when compared to Abilify.  The patient reports that she did have an episode recently where she was sick and nauseated with frequent vomiting and had difficulty taking her regular, with some worsening of her mood stability at that time, though states that this resolved after the week that she was struggling with her gastrointestinal symptoms and was able to start taking her medications again.  She also takes BuSpar 15 mg twice daily for anxiety and does feel like this is helpful as well.  She feels that her current  regimen is the most helpful regimen that she has been on with balance of improvement in symptoms and tolerability.  However, she has noticed that she has had progressively worsening movements in her hands, feet, and tongue.  She states that these have been involuntary and she can stop at one consciously thinking about this, though at rest will have frequent hand tremors and rocking of her feet.  She notes that her tonic is frequently moving inside of her mouth, though no facial movements.  The patient denies any stiffness in her muscles or changes in her gait.  She does not demonstrate any facial masking.  She states that these symptoms have caused increased distress over time.    The patient reports that otherwise her mood has been good.  She denies any recent feelings of depression and denies any anhedonia, poor sleep, poor appetite, feelings of guilt, or hopelessness.  She does not demonstrate any symptoms of dale during session today.  She is logical, organized, goal directed.  No pressured speech noted during assessment.  The patient notes that she is a chronically anxious person and when she gets overwhelmed she emotionally shuts down and has difficulty doing tasks.  However, she has noticed that this has improved since being on BuSpar.    Other than the above-mentioned medications, the patient reports that she has been on Wellbutrin, Topamax, and Xanax in the past and believes that she has been on Lexapro as well, though has difficulty recalling any other medications that she has been on.  As above, she feels that her current regimen has been the most helpful regimen that she has been on.    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  She notes that she has good support from her family at this time.  She has been working to adjust her work schedule to manage her stressors.  She notes occasional marijuana use and occasional alcohol, though denies any heavy alcohol use or  frequent substance use.  She states that she has switched from cigarettes to bathing approximately 1 month ago and is working towards quitting nicotine.    The patient denies any current SI, HI, or AVH.  She is able to appropriately plan for safety and reports that she would go to the emergency department if she were to have any thoughts to harm herself or others.  Spent time discussing her symptoms and how these have impacted her life.  Discussed possibility of initiating VMAT 2 inhibitor including risks, benefits, and alternatives.  Patient was agreeable to this.    Given the presentation, will start valbenazine 40 mg daily and other medications are maintained at the same dosage.  The patient was educated to call 911 or go to the nearest emergency room if the symptoms become overwhelming or unable to remain in control. Verbalized understanding and agreed to seek help in case of distress or concern for safety.    Review Of Systems:  Pertinent items are noted in HPI; all others are negative; no recent changes in medications or health status reported.    PHQ-2/9 Depression Screening                 Historical Information    Past Psychiatric History Update:   - No inpatient psychiatric admission since last encounter  - No SA or SIB since last encounter  - No incidence of violent behavior since last encounter    Past Trauma History Update:   - No new onset of abuse or traumatic events since last encounter     Past Medical History:    Past Medical History:   Diagnosis Date    Bipolar 1 disorder, depressed (HCC)         Past Surgical History:   Procedure Laterality Date    CHOLECYSTECTOMY       Allergies   Allergen Reactions    Penicillins Blisters       Substance Abuse History:    Social History     Substance and Sexual Activity   Alcohol Use None     Social History     Substance and Sexual Activity   Drug Use Not on file       Social History:    Social History     Socioeconomic History    Marital status: /Civil  Union     Spouse name: Not on file    Number of children: Not on file    Years of education: Not on file    Highest education level: Not on file   Occupational History    Not on file   Tobacco Use    Smoking status: Former    Smokeless tobacco: Never   Substance and Sexual Activity    Alcohol use: Not on file    Drug use: Not on file    Sexual activity: Not on file   Other Topics Concern    Not on file   Social History Narrative    Not on file     Social Drivers of Health     Financial Resource Strain: Not on file   Food Insecurity: Not on file   Transportation Needs: Not on file   Physical Activity: Not on file   Stress: Not on file   Social Connections: Not on file   Intimate Partner Violence: Not on file   Housing Stability: Not on file       Family Psychiatric History:     No family history on file.    History Review: The following portions of the patient's history were reviewed and updated as appropriate: allergies, current medications, past family history, past medical history, past social history, past surgical history and problem list.       Objective      Vital signs in last 24 hours: Not checked - virtual visit    Mental Status Evaluation:  Appearance and attitude: appeared as stated age, cooperative and attentive, casually dressed with good hygiene  Eye contact: fair  Motor Function:  Involuntary movements of tongue, bilateral hand tremors.  Gait/station: Not observed  Speech: normal for rate, rhythm, volume, latency, amount  Language: No overt abnormality  Mood/affect: euthymic / Affect was euthymic, reactive, in full range, normal intensity and mood congruent  Thought Processes: sequential and goal-directed  Thought content: denies suicidal ideation or homicidal ideation; no delusions or first rank symptoms  Associations: intact associations  Perceptual disturbances: denies Auditory/Visual/Tactile Hallucinations  Orientation: oriented to time, person, place and to the situational context  Cognitive  Function: intact  Memory: recent and remote memory grossly intact  Intellect: average  Fund of knowledge: aware of current events, aware of past history, and vocabulary average  Impulse control: good  Insight/judgment: good/good    Laboratory Results: I have personally reviewed all pertinent laboratory/tests results    Recent Labs (last 6 months):   No visits with results within 6 Month(s) from this visit.   Latest known visit with results is:   Admission on 03/24/2022, Discharged on 03/24/2022   Component Date Value    WBC 03/24/2022 9.11     RBC 03/24/2022 5.40 (H)     Hemoglobin 03/24/2022 15.2     Hematocrit 03/24/2022 48.2 (H)     MCV 03/24/2022 89     MCH 03/24/2022 28.1     MCHC 03/24/2022 31.5     RDW 03/24/2022 12.7     MPV 03/24/2022 10.3     Platelets 03/24/2022 264     nRBC 03/24/2022 0     Segmented % 03/24/2022 63     Immature Grans % 03/24/2022 1     Lymphocytes % 03/24/2022 28     Monocytes % 03/24/2022 6     Eosinophils Relative 03/24/2022 2     Basophils Relative 03/24/2022 0     Absolute Neutrophils 03/24/2022 5.77     Absolute Immature Grans 03/24/2022 0.07     Absolute Lymphocytes 03/24/2022 2.52     Absolute Monocytes 03/24/2022 0.57     Eosinophils Absolute 03/24/2022 0.16     Basophils Absolute 03/24/2022 0.02     Protime 03/24/2022 13.1     INR 03/24/2022 1.01     PTT 03/24/2022 29     Sodium 03/24/2022 142     Potassium 03/24/2022 3.8     Chloride 03/24/2022 107     CO2 03/24/2022 28     ANION GAP 03/24/2022 7     BUN 03/24/2022 17     Creatinine 03/24/2022 0.82     Glucose 03/24/2022 84     Calcium 03/24/2022 8.6     AST 03/24/2022 17     ALT 03/24/2022 25     Alkaline Phosphatase 03/24/2022 116     Total Protein 03/24/2022 6.7     Albumin 03/24/2022 3.6     Total Bilirubin 03/24/2022 0.53     eGFR 03/24/2022 81     hs TnI 0hr 03/24/2022 2     hs TnI 2hr 03/24/2022 3     Delta 2hr hsTnI 03/24/2022 1     Ventricular Rate 03/24/2022 76     Atrial Rate 03/24/2022 76     AZ Interval 03/24/2022  138     QRSD Interval 03/24/2022 82     QT Interval 03/24/2022 362     QTC Interval 03/24/2022 407     P Axis 03/24/2022 15     QRS Axis 03/24/2022 -7     T Wave [place] 03/24/2022 25              Assessment & Plan    This patient is a 54-year-old female with a history of bipolar 1 disorder and generalized anxiety disorder who presents as a consultation for evaluation of tardive dyskinesia symptoms.  She is currently managed at the [facility] office and has had good stability on current medication regimen.  She is currently managed on Vraylar 3 mg daily and BuSpar 15 mg twice daily.  She has been tried on several medications in the past and has had significant symptoms including history that appears to be consistent with mixed episodes of bipolar disorder.  She has had relative stability, as above, though has had adverse reaction of involuntary movements of her tongue, hands, and feet.  These occur at rest and the patient is able to stop these when consciously working towards this, though are interfering with her life and causing her distress.  She has been tried on propranolol with no benefit.  Symptoms do appear to be consistent with tardive dyskinesia given timeline and symptomatology.  Has these have been causing distress and interference in her life, the patient would likely benefit from a trial of a VMAT 2 inhibitor.  The patient has otherwise been doing well with regard to mood symptoms.  She denies any SI, HI, or AVH and does not demonstrate an imminent danger to her safety or to the safety of others.  Medication management as below.     Assessment & Plan  Bipolar 1 disorder, depressed, partial remission (HCC)  Symptoms appear to be fairly stable at this time.    Continue Vraylar 3 mg daily.         GUERDA (generalized anxiety disorder)  Symptoms appear to be fairly stable at this time.    Continue Buspar 15 mg BID.         Tardive dyskinesia    Orders:    Valbenazine Tosylate 40  MG CAPS; Take 40 mg by mouth in the morning      Treatment Recommendations/Precautions:    - Educated about healthy life style, risk of falls/sedation and addiction. Patient was receptive to education.  - Medications sent to the patient's pharmacy for 90 day supply       - Psychoeducation provided to the patient and benefits, potential risks and side effects discussed; importance of compliance with the psychiatric treatment reiterated, and the patient verbalized understanding of the matter     - RTC in 4 weeks     - The patient was educated about 24 hour and weekend coverage for urgent situations accessed by calling [facility] main practice number   - Patient was educated to call [place] Suicide Prevention Lifeline (3-655-892-AQJH [1884]) for behavioral crisis at anytime or 911 for any safety concerns, or go to nearest ER if her symptoms become overwhelming or unmanageable.      Medications Risks/Benefits      Risks, Benefits And Possible Side Effects Of Medications:    Risks, benefits, and possible side effects of medications explained to Cecy and she verbalizes understanding and agreement for treatment.    Controlled Medication Discussion:     Not applicable    Psychotherapy Provided:     Individual psychotherapy provided: Medication changes discussed with Cecy.  Medication education provided to Cecy.  Discussed with Cecy psychiatric history, medication management history, and coping with current symptoms.  Importance of medication and treatment compliance reviewed with Cecy.  Reassurance and supportive therapy provided.    Psychoeducation provided to the patient and was educated about the importance of compliance with the medications and psychiatric treatment  Supportive psychotherapy provided to the patient  Patient's emotions were validated and specific labeled praise provided.   [place] suggestions were offered in a supportive non-critical way.     Treatment Plan:    Completed and  signed during the session:  Not applicable - to be done by primary treating provider.    Eh Dean MD 02/24/25          This note was completed in part utilizing Dragon dictation Software. Grammatical, translation, syntax errors, random word insertions, spelling mistakes, and incomplete sentences may be an occasional consequence of this system secondary to software limitations with voice recognition, ambient noise, and hardware issues. If you have any questions or concerns about the content, text, or information contained within the body of this dictation, please contact the provider for clarification.

## 2025-02-24 NOTE — TELEPHONE ENCOUNTER
Patient is calling regarding cancelling an appointment.    Date/Time: 3/25 @10am    Reason: new medication    Patient was rescheduled: YES [x] NO []  If yes, when was Patient reschedule for: 3/27 @10:30am    Patient requesting call back to reschedule: YES [] NO [x]

## 2025-02-25 NOTE — ASSESSMENT & PLAN NOTE
Current symptoms appear to be consistent with Tardive Dyskinesia. Symptoms are disruptive to their life and causing distress.    Start valbenazine 40 mg daily with plan to titrate to efficacy as indicated.

## 2025-02-28 ENCOUNTER — SOCIAL WORK (OUTPATIENT)
Dept: BEHAVIORAL/MENTAL HEALTH CLINIC | Facility: CLINIC | Age: 55
End: 2025-02-28
Payer: COMMERCIAL

## 2025-02-28 DIAGNOSIS — F31.30 BIPOLAR I DISORDER, MOST RECENT EPISODE DEPRESSED (HCC): Primary | ICD-10-CM

## 2025-02-28 PROCEDURE — 90837 PSYTX W PT 60 MINUTES: CPT

## 2025-02-28 NOTE — PSYCH
"Behavioral Health Psychotherapy Progress Note    Psychotherapy Provided: Individual Psychotherapy     1. Bipolar I disorder, most recent episode depressed (HCC)            Goals addressed in session: Goal 1     DATA: ct shared that her  almost overdosed on xanax last week.  Ct  takes methadone daily and took over 5 mg of xanax.  Ct almost called 911.  Ct daughter was inpatient for a week after verbalizing SI.  Ct feels that daughter is manipulative.  Ct other daughter will be starting softball but is grounded due to not following rules.  Ct and ex  are communicating a little better.  Ct is also talking with son on a regular basis.  Ct is sleeping well and is cutting down on nicotine.  Ct will get back into counseling on a regular basis moving forward.  During this session, this clinician used the following therapeutic modalities: Cognitive Behavioral Therapy    Substance Abuse was not addressed during this session. If the client is diagnosed with a co-occurring substance use disorder, please indicate any changes in the frequency or amount of use: na. Stage of change for addressing substance use diagnoses: No substance use/Not applicable    ASSESSMENT:  Cecy Tao presents with a Anxious mood.     her affect is Normal range and intensity, which is congruent, with her mood and the content of the session. The client has made progress on their goals.    No SI/HI Cecy Tao presents with a none risk of suicide, none risk of self-harm, and none risk of harm to others.    For any risk assessment that surpasses a \"low\" rating, a safety plan must be developed.    A safety plan was indicated: no  If yes, describe in detail na    PLAN: Between sessions, Cecy Tao will manage moods. At the next session, the therapist will use Cognitive Behavioral Therapy to address anxiety/marriage.    Behavioral Health Treatment Plan and Discharge Planning: Cecy Tao is aware of and agrees to continue to " work on their treatment plan. They have identified and are working toward their discharge goals. yes    Depression Follow-up Plan Completed: Not applicable    Visit start and stop times:    02/28/25

## 2025-02-28 NOTE — BH CRISIS PLAN
Client Name: Cecy Tao       Client YOB: 1970    [place] Safety Plan      Creation Date: 2/28/25 Update Date: 2/27/26   Created By: Zac Lopez LPC Last Updated By: Zac Lopez LPC      Step 1: Warning Signs:   Warning Signs   depressed mood            Step 2: Internal Coping Strategies:   Internal Coping Strategies   take a nap   take a bath            Step 3: People and social settings that provide distraction:   Name Contact Information   Navin in cell   Heather in cell            Step 4: People whom I can ask for help during a crisis:      Name Contact Information    Navin in cell      Step 5: Professionals or agencies I can contact during a crisis:      Clinican/Agency Name Phone Emergency Contact    suicide and crisis lifeline 988       Local Emergency Department Emergency Department Phone Emergency Department Address    [facility] [phone]         Crisis Phone Numbers:   Suicide Prevention Lifeline: Call or Text  988 Crisis Text Line: Text HOME to 085-118   Please note: Some [facility] do not have a separate number for Child/Adolescent specific crisis. If your county is not listed under Child/Adolescent, please call the adult number for your county      Adult Crisis Numbers: Child/Adolescent Crisis Numbers   [facility]: [phone] [facility]: [phone]   [facility]: [phone] [facility]: [phone]   [facility]: [phone] [place]: [phone]   [facility]: [phone] [place]: [phone]   [facility]: [phone]   [facility]: [phone]   [facility]: [phone]   [facility]: [phone] (daytime) [phone] (after hours, weekends, holidays)      Step 6: Making the environment safer (plan for lethal means safety):   Patient did not identify any lethal methods: Yes     Optional: What is most important to me and worth living for?   My children      Daniel Safety Plan. Nayeli Burton and Ben Stone. Used with permission of the authors.

## 2025-02-28 NOTE — BH TREATMENT PLAN
Outpatient Behavioral Health Psychotherapy Treatment Plan    Cecy Tao  1970     Date of Initial Psychotherapy Assessment: 9/2/2021   Date of Current Treatment Plan: 02/28/25  Treatment Plan Target Date: 6/23/2025  Treatment Plan Expiration Date: 6/23/2025    Diagnosis:   1. Bipolar I disorder, most recent episode depressed (HCC)            Area(s) of Need: Mood instability    Long Term Goal 1 (in the client's own words): I want to have better mental health and improve relationships    Stage of Change: Preparation    Target Date for completion: 6/23/2025     Anticipated therapeutic modalities: CBT     People identified to complete this goal: ct and counselor      Objective 1: (identify the means of measuring success in meeting the objective): ct will attend medication management and take meds as prescribed      Objective 2: (identify the means of measuring success in meeting the objective): ct will try and improve relationship with ex  and son         I am currently under the care of a [facility] psychiatric provider: yes    My [facility] psychiatric provider is: Waleska Marte    I am currently taking psychiatric medications: Yes, as prescribed    I feel that I will be ready for discharge from mental health care when I reach the following (measurable goal/objective): When I am feeling better mentally    For children and adults who have a legal guardian:   Has there been any change to custody orders and/or guardianship status? No. If yes, attach updated documentation.    I have reviewed my Crisis Plan and have been offered a copy of this plan    Behavioral Health Treatment Plan  Luke: Diagnosis and Treatment Plan explained to Cecy Tao acknowledges an understanding of their diagnosis. Cecy Tao agrees to this treatment plan.    I have been offered a copy of this Treatment Plan. yes

## 2025-03-06 ENCOUNTER — TELEPHONE (OUTPATIENT)
Age: 55
End: 2025-03-06

## 2025-03-06 NOTE — TELEPHONE ENCOUNTER
Lita from [employer] calling to get fax number to send prior auth over for patient medication

## 2025-03-12 NOTE — TELEPHONE ENCOUNTER
Lita from [employer] called back to make sure we received the fax. They said they have not received the information and is requesting the office expedite. This request. The fax has been scanned into patient chart with no information on a prior auth being started for this medication. Please review and advise.

## 2025-03-26 ENCOUNTER — TELEPHONE (OUTPATIENT)
Age: 55
End: 2025-03-26

## 2025-03-26 NOTE — TELEPHONE ENCOUNTER
Patient called and requested to cancel appointment for 3/27 at 10:30am. She reported that she had a death in the family. She will call back at later time to reschedule.

## 2025-05-05 ENCOUNTER — TELEPHONE (OUTPATIENT)
Age: 55
End: 2025-05-05

## 2025-05-05 NOTE — TELEPHONE ENCOUNTER
Patient contacted the office to schedule a follow up visit with provider. Patient is now scheduled for 5/6/25  at 8AM in office.

## 2025-05-06 ENCOUNTER — SOCIAL WORK (OUTPATIENT)
Dept: BEHAVIORAL/MENTAL HEALTH CLINIC | Facility: CLINIC | Age: 55
End: 2025-05-06
Payer: COMMERCIAL

## 2025-05-06 DIAGNOSIS — F31.30 BIPOLAR I DISORDER, MOST RECENT EPISODE DEPRESSED (HCC): Primary | ICD-10-CM

## 2025-05-06 PROCEDURE — 90834 PSYTX W PT 45 MINUTES: CPT
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
## 2025-05-20 ENCOUNTER — SOCIAL WORK (OUTPATIENT)
Dept: BEHAVIORAL/MENTAL HEALTH CLINIC | Facility: CLINIC | Age: 55
End: 2025-05-20
Payer: COMMERCIAL

## 2025-05-20 DIAGNOSIS — F31.30 BIPOLAR I DISORDER, MOST RECENT EPISODE DEPRESSED (HCC): Primary | ICD-10-CM

## 2025-05-20 PROCEDURE — 90834 PSYTX W PT 45 MINUTES: CPT

## 2025-05-20 NOTE — PSYCH
"Behavioral Health Psychotherapy Progress Note    Psychotherapy Provided: Individual Psychotherapy     1. Bipolar I disorder, most recent episode depressed (HCC)            Goals addressed in session: Goal 1     DATA: ct shared that she has been adjusting to her work schedule which includes working nights and days.  Ct feels that she needs more help from  at home getting chores done.  Ct girls seem to be doing well.  Ct son talked to her about some personal issues and ct took him to the doctor.  Ct ex  has been quiet and not stressing ct out.  Ct talked today about some mild dissatisfaction with  in regards to intimacy.  We went over ways to have a discussion about sensitive subjects and how to have them with consideration.  Ct is willing to have the discussion.  During this session, this clinician used the following therapeutic modalities: Cognitive Behavioral Therapy    Substance Abuse was not addressed during this session. If the client is diagnosed with a co-occurring substance use disorder, please indicate any changes in the frequency or amount of use: na. Stage of change for addressing substance use diagnoses: No substance use/Not applicable    ASSESSMENT:  Cecy Tao presents with a Anxious mood.     her affect is Normal range and intensity, which is congruent, with her mood and the content of the session. The client has made progress on their goals.    No SI/HI Cecy Tao presents with a none risk of suicide, none risk of self-harm, and none risk of harm to others.    For any risk assessment that surpasses a \"low\" rating, a safety plan must be developed.    A safety plan was indicated: no  If yes, describe in detail na    PLAN: Between sessions, Cecy Tao will manage moods/communication. At the next session, the therapist will use Cognitive Behavioral Therapy to address Mood disorder.    Behavioral Health Treatment Plan and Discharge Planning: Cecy Tao is aware of and " agrees to continue to work on their treatment plan. They have identified and are working toward their discharge goals. yes    Depression Follow-up Plan Completed: Not applicable    Visit start and stop times:    05/20/25  Start Time: 0847  Stop Time: 0933  Total Visit Time: 46 minutes

## 2025-05-30 DIAGNOSIS — F31.75 BIPOLAR 1 DISORDER, DEPRESSED, PARTIAL REMISSION (HCC): ICD-10-CM

## 2025-06-02 ENCOUNTER — TELEMEDICINE (OUTPATIENT)
Dept: PSYCHIATRY | Facility: CLINIC | Age: 55
End: 2025-06-02
Payer: COMMERCIAL

## 2025-06-02 ENCOUNTER — TELEPHONE (OUTPATIENT)
Age: 55
End: 2025-06-02

## 2025-06-02 ENCOUNTER — TELEPHONE (OUTPATIENT)
Dept: PSYCHIATRY | Facility: CLINIC | Age: 55
End: 2025-06-02

## 2025-06-02 DIAGNOSIS — F31.75 BIPOLAR 1 DISORDER, DEPRESSED, PARTIAL REMISSION (HCC): Primary | ICD-10-CM

## 2025-06-02 DIAGNOSIS — Z91.199 NO-SHOW FOR APPOINTMENT: Primary | ICD-10-CM

## 2025-06-02 DIAGNOSIS — F41.1 GAD (GENERALIZED ANXIETY DISORDER): ICD-10-CM

## 2025-06-02 DIAGNOSIS — G24.01 TARDIVE DYSKINESIA: ICD-10-CM

## 2025-06-02 PROCEDURE — 99214 OFFICE O/P EST MOD 30 MIN: CPT | Performed by: PHYSICIAN ASSISTANT

## 2025-06-02 PROCEDURE — NOSHOW: Performed by: PHYSICIAN ASSISTANT

## 2025-06-02 NOTE — PSYCH
No Call. No Show. No Charge    Cecy Tao no showed 06/02/25 appointment , staff called and left message to reschedule appointment     Treatment Plan not due at this session.

## 2025-06-03 ENCOUNTER — TELEPHONE (OUTPATIENT)
Age: 55
End: 2025-06-03

## 2025-06-03 NOTE — ASSESSMENT & PLAN NOTE
Continue Vraylar 3 mg daily for mood  Continue psychotherapy    Orders:    cariprazine (Vraylar) 3 MG capsule; Take 1 capsule (3 mg total) by mouth daily

## 2025-06-03 NOTE — TELEPHONE ENCOUNTER
Patient contacted the office to schedule a follow up visit with provider. Patient is now scheduled for 6/4/25  at 8am in office.

## 2025-06-03 NOTE — PSYCH
MEDICATION MANAGEMENT NOTE    Name: Cecy Tao      : 1970      MRN: [id number]  Encounter Provider: Waleska Limon PA-C  Encounter Date: 2025   Encounter department: [facility]    Insurance: Payor: COMMERCIAL MISCELLANEOUS / Plan: COMMERCIAL MISCELLANEOUS / Product Type: Fee for Service Commercial /      Reason for Visit:   Chief Complaint   Patient presents with    Medication Management    Follow-up    Virtual Regular Visit   :  Assessment & Plan  Bipolar 1 disorder, depressed, partial remission (HCC)  Continue Vraylar 3 mg daily for mood  Continue psychotherapy    Orders:    cariprazine (Vraylar) 3 MG capsule; Take 1 capsule (3 mg total) by mouth daily    Tardive dyskinesia  Patient was seen by supervising physician for consult.  It was recommended that she start Ingrezza for tar dive dyskinesia.  She reports that she was unable to obtain the medication due to a prior Auth issue.  Will resend medication and obtain prior Auth.    Orders:    Valbenazine Tosylate 40 MG CAPS; Take 40 mg by mouth in the morning    GUERDA (generalized anxiety disorder)  Stop taking BuSpar as patient has not found it helpful for symptoms             Treatment Recommendations:    Educated about diagnosis and treatment modalities. Verbalizes understanding and agreement with the treatment plan.  Discussed self monitoring of symptoms, and symptom monitoring tools.  Discussed medications and if treatment adjustment was needed or desired.  Medication management every 2 months  Aware of 24 hour and weekend coverage for urgent situations accessed by calling [facility] main practice number  I am scheduling this patient out for greater than 3 months: No    Medications Risks/Benefits:      Risks, Benefits And Possible Side Effects Of Medications:    Risks, benefits, and possible side effects of medications explained to Cecy and she (or legal representative) verbalizes  understanding and agreement for treatment.    Controlled Medication Discussion:     Not applicable      History of Present Illness     Cecy is seen today for a follow up for bipolar disorder, anxiety, and tardive dyskinesia.  She reports that unfortunately she was unable to obtain the medication that was recommended by physician who provided psychiatry consult.  She is unsure if there was a prior Auth needed.  She would still like to trial the medication.  She denies any significant mood changes.  She denies any significant depressive or anxiety symptoms.  She does not feel that BuSpar is helpful so she discontinued the medication.  She shares that she switched to working nights at her job which has been helpful because she enjoys the night shift team.  She reports getting adequate sleep and nutrition.    She denies suicidal ideation, intent or plan at present; denies homicidal ideation, intent or plan at present.    She denies auditory hallucinations, denies visual hallucinations, denies delusions.        HPI ROS Appetite Changes and Sleep:     She reports normal sleep, normal appetite, normal energy level    Review Of Systems: A review of systems is obtained and is negative except for the pertinent positives listed in HPI/Subjective above.      Current Rating Scores:     None completed today.    Areas of Improvement: reviewed in HPI/Subjective Section      Past Medical History[1]  Past Surgical History[2]  Allergies: Allergies[3]    Current Outpatient Medications   Medication Instructions    albuterol (PROVENTIL HFA,VENTOLIN HFA) 90 mcg/act inhaler TAKE 2 PUFFS BY MOUTH EVERY 4 TO 6 HOURS AS NEEDED    cariprazine (VRAYLAR) 3 mg, Oral, Daily    ergocalciferol (VITAMIN D2) 50,000 units take 1 capsule by mouth one time per week    levothyroxine 100 mcg tablet 1 tablet, Oral, Daily    meloxicam (MOBIC) 7.5 mg tablet 1 tablet, Oral, Daily    predniSONE 10 mg tablet TAKE 5 TABLETS BY ORAL ROUTE ON DAY 1, 4 TABLETS ON  DAY 2, 3 ON DAY 3, 2 ON DAY 4, 1 ON DAY 5.    Valbenazine Tosylate 40 mg, Oral, Daily        Substance Abuse History:    Tobacco, Alcohol and Drug Use History     Tobacco Use    Smoking status: Former    Smokeless tobacco: Never   Substance Use Topics    Alcohol use: Not on file    Drug use: Not on file          Social History:    Social History     Socioeconomic History    Marital status: /Civil Union     Spouse name: Not on file    Number of children: Not on file    Years of education: Not on file    Highest education level: Not on file   Occupational History    Not on file   Other Topics Concern    Not on file   Social History Narrative    Not on file        Family Psychiatric History:     Family History[4]    Medical History Reviewed by provider this encounter:  Tobacco  Allergies  Meds  Problems  Med Hx  Surg Hx  Fam Hx          Objective   There were no vitals taken for this visit.     Mental Status Evaluation:    Appearance age appropriate, casually dressed   Behavior pleasant, cooperative, calm   Speech normal rate, normal volume, normal pitch, spontaneous   Mood euthymic   Affect normal range and intensity, appropriate   Thought Processes organized, goal directed   Thought Content no overt delusions   Perceptual Disturbances: no auditory hallucinations, no visual hallucinations   Abnormal Thoughts  Risk Potential Suicidal ideation - None at present  Homicidal ideation - None at present  Potential for aggression - No   Orientation oriented to person, place, time/date, and situation   Memory recent and remote memory grossly intact   Consciousness alert and awake   Attention Span Concentration Span attention span and concentration are age appropriate   Intellect appears to be of average intelligence   Insight intact   Judgement intact   Muscle Strength and  Gait unable to assess today due to virtual visit   Motor activity unable to assess today due to virtual visit   Language no difficulty naming  "common objects, no difficulty repeating a phrase   Fund of Knowledge adequate knowledge of current events  adequate fund of knowledge regarding past history       Laboratory Results: I have personally reviewed all pertinent laboratory/tests results    Lipid Profile: No results found for: \"CHOLESTEROL\", \"HDL\", \"TRIG\", \"LDLCALC\", \"NONHDLC\"  Hemoglobin A1C: No results found for: \"HGBA1C\", \"EAG\"    Suicide/Homicide Risk Assessment:    Risk of Harm to Self:  Based on today's assessment, Cecy presents the following risk of harm to self: none    Risk of Harm to Others:  Based on today's assessment, Cecy presents the following risk of harm to others: none    The following interventions are recommended: Continue medication management. Continue psychotherapy. No other intervention changes indicated at this time.    Psychotherapy Provided:     Individual psychotherapy provided: No    Treatment Plan:    Completed and signed during the session: Not applicable - Treatment Plan to be completed by [facility] therapist.    Goals: Progress towards Treatment Plan goals - in Assessment and Plan Section        Note Share:    This note was not shared with the patient due to this is a psychotherapy note    Administrative Statements   Administrative Statements   Encounter provider Waleska Limon PA-C    The Patient is located at Home and in the following state in which I hold an active license [place].    The patient was identified by name and date of birth. Cecy Tao was informed that this is a telemedicine visit and that the visit is being conducted through the Epic Embedded platform. She agrees to proceed..  My office door was closed. No one else was in the room.  She acknowledged consent and understanding of privacy and security of the video platform. The patient has agreed to participate and understands they can discontinue the visit at any time.    I have spent a total time of 20 minutes in caring for " "this patient on the day of the visit/encounter including Risks and benefits of tx options, Instructions for management, Patient and family education, and Importance of tx compliance, not including the time spent for establishing the audio/video connection.    Visit Time  Visit Start Time: 1130am  Visit Stop Time: 1150am  Total Visit Duration: 20 minutes    Portions of the record may have been created with voice recognition software. Occasional wrong word or \"sound a like\" substitutions may have occurred due to the inherent limitations of voice recognition software. Read the chart carefully and recognize, using context, where substitutions have occurred.    Waleska Limon PA-C 06/02/25       [1]   Past Medical History:  Diagnosis Date    Bipolar 1 disorder, depressed (HCC)    [2]   Past Surgical History:  Procedure Laterality Date    CHOLECYSTECTOMY     [3]   Allergies  Allergen Reactions    Penicillins Blisters   [4] No family history on file.    "

## 2025-06-03 NOTE — ASSESSMENT & PLAN NOTE
Patient was seen by supervising physician for consult.  It was recommended that she start Ingrezza for tar dive dyskinesia.  She reports that she was unable to obtain the medication due to a prior Auth issue.  Will resend medication and obtain prior Auth.    Orders:    Valbenazine Tosylate 40 MG CAPS; Take 40 mg by mouth in the morning

## 2025-06-04 ENCOUNTER — SOCIAL WORK (OUTPATIENT)
Dept: BEHAVIORAL/MENTAL HEALTH CLINIC | Facility: CLINIC | Age: 55
End: 2025-06-04
Payer: COMMERCIAL

## 2025-06-04 DIAGNOSIS — F31.30 BIPOLAR I DISORDER, MOST RECENT EPISODE DEPRESSED (HCC): Primary | ICD-10-CM

## 2025-06-04 PROCEDURE — 90834 PSYTX W PT 45 MINUTES: CPT

## 2025-06-04 NOTE — BH TREATMENT PLAN
Outpatient Behavioral Health Psychotherapy Treatment Plan    Cecy Tao  1970     Date of Initial Psychotherapy Assessment: 9/2/2021   Date of Current Treatment Plan: 06/04/25  Treatment Plan Target Date: 11/23/2025  Treatment Plan Expiration Date: 11/23/2025    Diagnosis:   1. Bipolar I disorder, most recent episode depressed (HCC)            Area(s) of Need: Anxiety    Long Term Goal 1 (in the client's own words): I want to manage my stress at work and remain more calm    Stage of Change: Action    Target Date for completion: 11/23/2025     Anticipated therapeutic modalities: CBT     People identified to complete this goal: client and counselor      Objective 1: (identify the means of measuring success in meeting the objective): ct will identify triggers to stress at work and develop plan to cope      Objective 2: (identify the means of measuring success in meeting the objective): ct will use 3 coping skills at work to manage stress         I am currently under the care of a [facility] psychiatric provider: yes    My [facility] psychiatric provider is: Waleska Limon    I am currently taking psychiatric medications: Yes, as prescribed    I feel that I will be ready for discharge from mental health care when I reach the following (measurable goal/objective): Probably never    For children and adults who have a legal guardian:   Has there been any change to custody orders and/or guardianship status? No. If yes, attach updated documentation.    I have reviewed my Crisis Plan and have been offered a copy of this plan    Behavioral Health Treatment Plan  Luke: Diagnosis and Treatment Plan explained to Cecy Tao acknowledges an understanding of their diagnosis. Cecy Tao agrees to this treatment plan.    I have been offered a copy of this Treatment Plan. yes
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
## 2025-06-06 DIAGNOSIS — G24.01 TARDIVE DYSKINESIA: ICD-10-CM

## 2025-06-06 NOTE — TELEPHONE ENCOUNTER
"[facility] called in stating they don't use \"cover my meds\" or they would have contacted the provider or office that way regarding the patients needed prior authorization for the Valbenazine Tosylate 40mg capsule in the morning.    Caller stated the telephone number to get back to them is; [phone]  "

## 2025-06-09 ENCOUNTER — TELEPHONE (OUTPATIENT)
Dept: PSYCHIATRY | Facility: CLINIC | Age: 55
End: 2025-06-09

## 2025-06-09 NOTE — TELEPHONE ENCOUNTER
Received a call from the [facility] stating that this medication will need a prior auth. They stated to contact this number to do the prior auth. [phone]

## 2025-06-09 NOTE — TELEPHONE ENCOUNTER
Called [facility] [phone] to submit PA for Valbenazine Tosylate (Ingrezza) 40 mg capsule.  Rep stated PA form will be faxed by end of business day, if urgent please patrick on the form and the turn around time for decision will be 3 days

## 2025-06-09 NOTE — TELEPHONE ENCOUNTER
PA for Valbenazine Tosylate  (Ingrezza) 40 mg capsule SUBMITTED to [employer]    via    []CMM-KEY:   []Surescripts-Case ID #   []Availity-Auth ID # NDC #   [x]Faxed to plan [phone]  []Other website   []Phone call Case ID #     []PA sent as URGENT    All office notes, labs and other pertaining documents and studies sent. Clinical questions answered. Awaiting determination from insurance company.     Turnaround time for your insurance to make a decision on your Prior Authorization can take 7-21 business days.

## 2025-06-16 NOTE — TELEPHONE ENCOUNTER
PA for Valbenazine Tosylate  (Ingrezza) 40 mg capsule DENIED    Reason:(Screenshot if applicable)        Message sent to office clinical pool & provider Yes    Denial letter scanned into Media Yes    We can gladly do an appeal but the process can take about 30-60 days to provide determination. Please have the office staff schedule a Peer to Peer at phone  . If an appeal is truly warranted please have Provider send clinical documentation to the PA department to support the appeal.     **Please follow up with your patient regarding denial and next steps**

## 2025-08-19 ENCOUNTER — HOSPITAL ENCOUNTER (EMERGENCY)
Facility: HOSPITAL | Age: 55
Discharge: HOME | End: 2025-08-19
Attending: STUDENT IN AN ORGANIZED HEALTH CARE EDUCATION/TRAINING PROGRAM | Admitting: STUDENT IN AN ORGANIZED HEALTH CARE EDUCATION/TRAINING PROGRAM
Payer: COMMERCIAL

## 2025-08-19 ENCOUNTER — APPOINTMENT (EMERGENCY)
Dept: RADIOLOGY | Facility: HOSPITAL | Age: 55
End: 2025-08-19
Payer: COMMERCIAL

## 2025-08-19 ENCOUNTER — TELEPHONE (OUTPATIENT)
Age: 55
End: 2025-08-19

## 2025-08-19 VITALS
WEIGHT: 291 LBS | TEMPERATURE: 98.8 F | BODY MASS INDEX: 49.68 KG/M2 | SYSTOLIC BLOOD PRESSURE: 149 MMHG | HEIGHT: 64 IN | DIASTOLIC BLOOD PRESSURE: 94 MMHG | RESPIRATION RATE: 18 BRPM | HEART RATE: 95 BPM | OXYGEN SATURATION: 96 %

## 2025-08-19 DIAGNOSIS — M79.672 PAIN OF LEFT HEEL: Primary | ICD-10-CM

## 2025-08-19 PROCEDURE — 99284 EMERGENCY DEPT VISIT MOD MDM: CPT | Performed by: STUDENT IN AN ORGANIZED HEALTH CARE EDUCATION/TRAINING PROGRAM

## 2025-08-19 PROCEDURE — 73650 X-RAY EXAM OF HEEL: CPT

## 2025-08-19 PROCEDURE — 73630 X-RAY EXAM OF FOOT: CPT

## 2025-08-19 PROCEDURE — 99283 EMERGENCY DEPT VISIT LOW MDM: CPT
